# Patient Record
Sex: FEMALE | Race: BLACK OR AFRICAN AMERICAN | Employment: OTHER | URBAN - METROPOLITAN AREA
[De-identification: names, ages, dates, MRNs, and addresses within clinical notes are randomized per-mention and may not be internally consistent; named-entity substitution may affect disease eponyms.]

---

## 2017-06-05 ENCOUNTER — HOSPITAL ENCOUNTER (OUTPATIENT)
Dept: LAB | Age: 82
Discharge: HOME | End: 2017-06-05
Payer: MEDICARE

## 2017-06-05 LAB
ALBUMIN SERPL BCP-MCNC: 3.5 G/DL (ref 3.4–5)
ALBUMIN/GLOB SERPL: 1 {RATIO} (ref 0.8–1.7)
ALP SERPL-CCNC: 79 U/L (ref 45–117)
ALT SERPL-CCNC: 22 U/L (ref 13–56)
ANION GAP BLD CALC-SCNC: 6 MMOL/L (ref 3–18)
AST SERPL W P-5'-P-CCNC: 21 U/L (ref 15–37)
BASOPHILS # BLD AUTO: 0 K/UL (ref 0–0.06)
BASOPHILS # BLD: 0 % (ref 0–2)
BILIRUB SERPL-MCNC: 0.8 MG/DL (ref 0.2–1)
BUN SERPL-MCNC: 15 MG/DL (ref 7–18)
BUN/CREAT SERPL: 15 (ref 12–20)
CALCIUM SERPL-MCNC: 8.9 MG/DL (ref 8.5–10.1)
CHLORIDE SERPL-SCNC: 106 MMOL/L (ref 100–108)
CHOLEST SERPL-MCNC: 131 MG/DL
CO2 SERPL-SCNC: 32 MMOL/L (ref 21–32)
CREAT SERPL-MCNC: 1.02 MG/DL (ref 0.6–1.3)
DIFFERENTIAL METHOD BLD: ABNORMAL
EOSINOPHIL # BLD: 0.2 K/UL (ref 0–0.4)
EOSINOPHIL NFR BLD: 3 % (ref 0–5)
ERYTHROCYTE [DISTWIDTH] IN BLOOD BY AUTOMATED COUNT: 14 % (ref 11.6–14.5)
GLOBULIN SER CALC-MCNC: 3.5 G/DL (ref 2–4)
GLUCOSE SERPL-MCNC: 78 MG/DL (ref 74–99)
HCT VFR BLD AUTO: 37.4 % (ref 35–45)
HDLC SERPL-MCNC: 50 MG/DL (ref 40–60)
HDLC SERPL: 2.6 {RATIO} (ref 0–5)
HGB BLD-MCNC: 11.8 G/DL (ref 12–16)
LDLC SERPL CALC-MCNC: 66.4 MG/DL (ref 0–100)
LIPID PROFILE,FLP: NORMAL
LYMPHOCYTES # BLD AUTO: 27 % (ref 21–52)
LYMPHOCYTES # BLD: 1.6 K/UL (ref 0.9–3.6)
MCH RBC QN AUTO: 29.4 PG (ref 24–34)
MCHC RBC AUTO-ENTMCNC: 31.6 G/DL (ref 31–37)
MCV RBC AUTO: 93.3 FL (ref 74–97)
MONOCYTES # BLD: 0.6 K/UL (ref 0.05–1.2)
MONOCYTES NFR BLD AUTO: 10 % (ref 3–10)
NEUTS SEG # BLD: 3.5 K/UL (ref 1.8–8)
NEUTS SEG NFR BLD AUTO: 60 % (ref 40–73)
PLATELET # BLD AUTO: 174 K/UL (ref 135–420)
PMV BLD AUTO: 11.4 FL (ref 9.2–11.8)
POTASSIUM SERPL-SCNC: 4.3 MMOL/L (ref 3.5–5.5)
PROT SERPL-MCNC: 7 G/DL (ref 6.4–8.2)
RBC # BLD AUTO: 4.01 M/UL (ref 4.2–5.3)
SODIUM SERPL-SCNC: 144 MMOL/L (ref 136–145)
TRIGL SERPL-MCNC: 73 MG/DL (ref ?–150)
VIT B12 SERPL-MCNC: 530 PG/ML (ref 211–911)
VLDLC SERPL CALC-MCNC: 14.6 MG/DL
WBC # BLD AUTO: 6 K/UL (ref 4.6–13.2)

## 2017-06-05 PROCEDURE — 36415 COLL VENOUS BLD VENIPUNCTURE: CPT | Performed by: INTERNAL MEDICINE

## 2017-06-05 PROCEDURE — 80053 COMPREHEN METABOLIC PANEL: CPT | Performed by: INTERNAL MEDICINE

## 2017-06-05 PROCEDURE — 85025 COMPLETE CBC W/AUTO DIFF WBC: CPT | Performed by: INTERNAL MEDICINE

## 2017-06-05 PROCEDURE — 80061 LIPID PANEL: CPT | Performed by: INTERNAL MEDICINE

## 2017-06-05 PROCEDURE — 82607 VITAMIN B-12: CPT | Performed by: INTERNAL MEDICINE

## 2017-06-05 PROCEDURE — 82550 ASSAY OF CK (CPK): CPT | Performed by: INTERNAL MEDICINE

## 2017-06-07 LAB
CK BB CFR SERPL ELPH: 0 %
CK MACRO1 CFR SERPL: 0 %
CK MACRO2 CFR SERPL: 0 %
CK MB CFR SERPL ELPH: 0 % (ref 0–3)
CK MM CFR SERPL ELPH: 100 % (ref 97–100)
CK SERPL-CCNC: 211 U/L (ref 24–173)

## 2017-09-14 ENCOUNTER — HOSPITAL ENCOUNTER (OUTPATIENT)
Dept: MAMMOGRAPHY | Age: 82
Discharge: HOME | End: 2017-09-14
Attending: INTERNAL MEDICINE
Payer: MEDICARE

## 2017-09-14 DIAGNOSIS — Z12.31 VISIT FOR SCREENING MAMMOGRAM: ICD-10-CM

## 2017-09-14 PROCEDURE — 77063 BREAST TOMOSYNTHESIS BI: CPT

## 2017-10-26 ENCOUNTER — OFFICE VISIT (OUTPATIENT)
Dept: CARDIOLOGY CLINIC | Age: 82
End: 2017-10-26

## 2017-10-26 VITALS
HEIGHT: 66 IN | WEIGHT: 215 LBS | SYSTOLIC BLOOD PRESSURE: 127 MMHG | DIASTOLIC BLOOD PRESSURE: 75 MMHG | BODY MASS INDEX: 34.55 KG/M2 | HEART RATE: 87 BPM

## 2017-10-26 DIAGNOSIS — I10 HYPERTENSION, UNSPECIFIED TYPE: Chronic | ICD-10-CM

## 2017-10-26 DIAGNOSIS — E78.2 MIXED HYPERLIPIDEMIA: ICD-10-CM

## 2017-10-26 DIAGNOSIS — N28.9 RENAL INSUFFICIENCY, MILD: Chronic | ICD-10-CM

## 2017-10-26 DIAGNOSIS — R07.9 CHEST PAIN, UNSPECIFIED TYPE: Primary | ICD-10-CM

## 2017-10-26 NOTE — PROGRESS NOTES
HISTORY OF PRESENT ILLNESS  Abraham Camarillo is a 80 y.o. female. New Patient   The history is provided by the patient. This is a chronic problem. The current episode started more than 1 week ago. The problem occurs every several days. The problem has not changed since onset. Associated symptoms include chest pain. Pertinent negatives include no abdominal pain, no headaches and no shortness of breath. Chest Pain (Angina)    The history is provided by the patient. This is a chronic problem. The current episode started more than 1 week ago. The problem has not changed since onset. The problem occurs every several days. The pain is associated with exertion and normal activity. The pain is present in the left side. The pain is at a severity of 3/10. The pain is mild. The quality of the pain is described as sharp. The pain radiates to the left jaw. Associated symptoms include malaise/fatigue. Pertinent negatives include no abdominal pain, no claudication, no cough, no diaphoresis, no dizziness, no fever, no headaches, no hemoptysis, no nausea, no orthopnea, no palpitations, no PND, no shortness of breath, no sputum production, no vomiting and no weakness. Risk factors include hypertension, obesity and dyslipidemia. Her past medical history is significant for HTN. Pertinent negatives include no cardiac catheterization, no echocardiogram and no stress thallium. Review of Systems   Constitutional: Positive for malaise/fatigue. Negative for chills, diaphoresis, fever and weight loss. HENT: Negative for congestion, ear discharge, ear pain, hearing loss, nosebleeds and tinnitus. Eyes: Negative for blurred vision. Respiratory: Negative for cough, hemoptysis, sputum production, shortness of breath, wheezing and stridor. Cardiovascular: Positive for chest pain. Negative for palpitations, orthopnea, claudication, leg swelling and PND. Gastrointestinal: Negative for abdominal pain, heartburn, nausea and vomiting. Musculoskeletal: Negative for myalgias and neck pain. Skin: Negative for itching and rash. Neurological: Negative for dizziness, tingling, tremors, focal weakness, loss of consciousness, weakness and headaches. Psychiatric/Behavioral: Negative for depression and suicidal ideas. No family history on file. Past Medical History:   Diagnosis Date    Arthritis     Hypercholesteremia     Hypertension     Nerve pain        Past Surgical History:   Procedure Laterality Date    HX CATARACT REMOVAL  2007 & 2008    bilateral    HX HYSTERECTOMY  1977    HX KNEE REPLACEMENT Right 2013    Dr. Nguyen Getting    \"slipped disc surgery\"       Social History   Substance Use Topics    Smoking status: Never Smoker    Smokeless tobacco: Never Used    Alcohol use Yes      Comment: glass of wine very seldom       Allergies   Allergen Reactions    Other Medication Itching and Swelling     Apples, pears, strawberries, cherries, any type of berries,avacado,pears,peaches,kiwi. Patient states cherries make throat swell and the others make her throat itch. Outpatient Prescriptions Marked as Taking for the 10/26/17 encounter (Office Visit) with Justino Estevez MD   Medication Sig Dispense Refill    carvedilol (COREG) 6.25 mg tablet Take 6.25 mg by mouth two (2) times daily (with meals).  torsemide (DEMADEX) 20 mg tablet Take 20 mg by mouth daily.  Calcium Carbonate-Vit D3-Min (CALTRATE 600+D PLUS MINERALS) 600 mg calcium- 400 unit Tab Take  by mouth two (2) times a day.  gabapentin (NEURONTIN) 300 mg capsule Take 300 mg by mouth two (2) times a day.  lisinopril (PRINIVIL, ZESTRIL) 20 mg tablet Take 20 mg by mouth daily. Indications: HYPERTENSION      atorvastatin (LIPITOR) 40 mg tablet Take 40 mg by mouth daily.  Indications: HYPERCHOLESTEROLEMIA          Visit Vitals    /75    Pulse 87    Ht 5' 5.5\" (1.664 m)    Wt 97.5 kg (215 lb)    BMI 35.23 kg/m2 Physical Exam   Constitutional: She is oriented to person, place, and time. She appears well-developed and well-nourished. No distress. HENT:   Head: Atraumatic. Mouth/Throat: No oropharyngeal exudate. Eyes: Conjunctivae are normal. Right eye exhibits no discharge. Left eye exhibits no discharge. No scleral icterus. Neck: Normal range of motion. Neck supple. No JVD present. No tracheal deviation present. No thyromegaly present. Cardiovascular: Normal rate and regular rhythm. Exam reveals no gallop. No murmur heard. Pulmonary/Chest: Effort normal and breath sounds normal. No stridor. She has no wheezes. She has no rales. Abdominal: Soft. There is no tenderness. There is no rebound and no guarding. Musculoskeletal: Normal range of motion. She exhibits no edema or tenderness. Lymphadenopathy:     She has no cervical adenopathy. Neurological: She is alert and oriented to person, place, and time. She exhibits normal muscle tone. Skin: Skin is warm. She is not diaphoretic. Psychiatric: She has a normal mood and affect. Her behavior is normal.     ekg sinus rhythm with no acute st-t changes    ASSESSMENT and PLAN    ICD-10-CM ICD-9-CM    1. Chest pain, unspecified type R07.9 786.50 2D ECHO COMPLETE ADULT (TTE)      SCHEDULE PHARMALOGICAL NUCLEAR STRESS TEST    with typical and atypical features   2. Hypertension, unspecified type I10 401.9 AMB POC EKG ROUTINE W/ 12 LEADS, INTER & REP   3. Mixed hyperlipidemia E78.2 272.2    4.  Renal insufficiency, mild N28.9 593.9      Orders Placed This Encounter    SCHEDULE PHARMALOGICAL NUCLEAR STRESS TEST     Standing Status:   Future     Standing Expiration Date:   10/26/2018    2D ECHO COMPLETE ADULT (TTE)     Standing Status:   Future     Standing Expiration Date:   4/26/2018     Order Specific Question:   Reason for Exam:     Answer:   chest pain    AMB POC EKG ROUTINE W/ 12 LEADS, INTER & REP     Order Specific Question:   Reason for Exam: Answer:   chest pain     Follow-up Disposition:  Return in about 3 weeks (around 11/16/2017). current treatment plan is effective, no change in therapy  reviewed diet, exercise and weight control  cardiovascular risk and specific lipid/LDL goals reviewed. Patient seen for chest pain- describes as sharp pain but with radiation to left jaw. Unable to walk on treadmill due to arthritis. Will proceed with lexiscan and echo to assess LV function.   F/u in 3 weeks

## 2017-10-26 NOTE — MR AVS SNAPSHOT
Visit Information Date & Time Provider Department Dept. Phone Encounter #  
 10/26/2017 10:45 AM Margie Montano MD [facility] [address] [facility] [id number] Follow-up Instructions Return in about 3 weeks (around 11/16/2017). Follow-up and Disposition History Your Appointments 11/1/2017  8:00 AM  
PROCEDURE with CA NUC [facility] [place] ([address]) Appt Note: with echo zander wt 215/moe [address], [address] [place] [address]  
[address], [address] [address]  
  
    
 11/1/2017  9:15 AM  
PROCEDURE with CA ECHO [facility] [place] ([address]) Appt Note: with nuc/ moe [address], [address] [place] [address]  
[address], [address] [address]  
  
    
 11/30/2017  2:45 PM  
ESTABLISHED PATIENT with Margie Montano MD  
[facility]) Appt Note: post nuc/echo [address], [address] [place] [address]  
[address], [address] Upcoming Health Maintenance Date Due DTaP/Tdap/Td series (1 - Tdap) 4/18/1956 ZOSTER VACCINE AGE 60> 2/18/1995 GLAUCOMA SCREENING Q2Y 4/18/2000 OSTEOPOROSIS SCREENING (DEXA) 4/18/2000 Pneumococcal 65+ High/Highest Risk (1 of 2 - PCV13) 4/18/2000 MEDICARE YEARLY EXAM 4/18/2000 INFLUENZA AGE 9 TO ADULT 8/1/2017 Allergies as of 10/26/2017  Review Complete On: 10/26/2017 By: Margie Montano MD  
  
 Severity Noted Reaction Type Reactions Other Medication  09/17/2013    Itching, Swelling Apples, pears, strawberries, cherries, any type of berries,avacado,pears,peaches,kiwi. Patient states cherries make throat swell and the others make her throat itch. Current Immunizations  Never Reviewed No immunizations on file. Not reviewed this visit You Were Diagnosed With   
  
 Codes Comments Chest pain, unspecified type    -  Primary ICD-10-CM: R07.9 ICD-9-CM: 786.50 with typical and atypical features Hypertension, unspecified type     ICD-10-CM: I10 
ICD-9-CM: 401.9 Mixed hyperlipidemia     ICD-10-CM: E78.2 ICD-9-CM: 272.2 Renal insufficiency, mild     ICD-10-CM: N28.9 ICD-9-CM: 593.9 Vitals BP Pulse Height(growth percentile) Weight(growth percentile) BMI OB Status 127/75 87 5' 5.5\" (1.664 m) 215 lb (97.5 kg) 35.23 kg/m2 Hysterectomy Smoking Status Never Smoker Vitals History BMI and BSA Data Body Mass Index Body Surface Area  
 35.23 kg/m 2 2.12 m 2 Preferred Pharmacy Pharmacy Name Phone [facility], [address] [facility] [phone] Your Updated Medication List  
  
   
This list is accurate as of: 10/26/17 11:54 AM.  Always use your most recent med list. amLODIPine 10 mg tablet Commonly known as:  Varma Mullet Take 10 mg by mouth daily. Indications: HYPERTENSION  
  
 azelastine 0.05 % ophthalmic solution Commonly known as:  OPTIVAR Administer  to both eyes two (2) times a day. Use in affected eye(s)  
  
 calcium 500 mg Tab Take 1 Tab by mouth daily. CALTRATE 600+D PLUS MINERALS 600 mg calcium- 400 unit Tab Generic drug:  Calcium Carbonate-Vit D3-Min Take  by mouth two (2) times a day. COMBIVENT  mcg/actuation inhaler Generic drug:  ipratropium-albuterol Take 2 Puffs by inhalation every six (6) hours as needed. COREG 6.25 mg tablet Generic drug:  carvedilol Take 6.25 mg by mouth two (2) times daily (with meals). furosemide 20 mg tablet Commonly known as:  LASIX Take 20 mg by mouth daily. gabapentin 300 mg capsule Commonly known as:  NEURONTIN Take 300 mg by mouth two (2) times a day. GAVISCON PO Take  by mouth as needed. LIPITOR 40 mg tablet Generic drug:  atorvastatin Take 40 mg by mouth daily. Indications: HYPERCHOLESTEROLEMIA  
  
 lisinopril 20 mg tablet Commonly known as:  Shira Nichols Take 20 mg by mouth daily. Indications: HYPERTENSION  
  
 OTHER Debax drops for ear wax as needed  
  
 oxyCODONE IR 5 mg immediate release tablet Commonly known as:  King[place]venjun Sant[place]erg Take 1 tablet by mouth every four (4) hours as needed (For pain rating 4-7). potassium chloride 10 mEq tablet Commonly known as:  KLOR-CON Take 10 mEq by mouth daily. [address] [facility] [address] AQUA NA  
by Nasal route as needed. timolol maleate 0.5 % Drpd ophthalmic solution Administer 1 Drop to both eyes two (2) times a day. torsemide 20 mg tablet Commonly known as:  DEMADEX Take 20 mg by mouth daily. We Performed the Following AMB POC EKG ROUTINE W/ 12 LEADS, INTER & REP [95194 CPT(R)] Follow-up Instructions Return in about 3 weeks (around 11/16/2017). To-Do List   
 11/20/2017 Cardiac Services:  2D ECHO COMPLETE ADULT (TTE)   
  
 11/20/2017 Procedures:  SCHEDULE PHARMALOGICAL NUCLEAR STRESS TEST Introducing [facility]! [facility] introduces [employer] patient portal. Now you can access parts of your medical record, email your doctor's office, and request medication refills online. 1. In your internet browser, go to [email or web address] 2. Click on the First Time User? Click Here link in the Sign In box. You will see the New Member Sign Up page. 3. Enter your [employer] Access Code exactly as it appears below. You will not need to use this code after youve completed the sign-up process. If you do not sign up before the expiration date, you must request a new code. · [employer] Access Code: [email or web address] Expires: 11/30/2017  2:24 PM 
 
4.  Enter the last four digits of your Social Security Number (xxxx) and Date of Birth (mm/dd/yyyy) as indicated and click Submit. You will be taken to the next sign-up page. 5. Create a Tr[employer] ID. This will be your [employer] login ID and cannot be changed, so think of one that is secure and easy to remember. 6. Create a Tr[employer] password. You can change your password at any time. 7. Enter your Password Reset Question and Answer. This can be used at a later time if you forget your password. 8. Enter your e-mail address. You will receive e-mail notification when new information is available in [address] 9. Click Sign Up. You can now view and download portions of your medical record. 10. Click the Download Summary menu link to download a portable copy of your medical information. If you have questions, please visit the Frequently Asked Questions section of the [employer] website. Remember, [employer] is NOT to be used for urgent needs. For medical emergencies, dial 911. Now available from your iPhone and Android! Please provide this summary of care documentation to your next provider. Your primary care clinician is listed as [address] [facility]. If you have any questions after today's visit, please call [phone].

## 2017-11-01 ENCOUNTER — CLINICAL SUPPORT (OUTPATIENT)
Dept: CARDIOLOGY CLINIC | Age: 82
End: 2017-11-01

## 2017-11-01 DIAGNOSIS — R07.9 CHEST PAIN, UNSPECIFIED TYPE: ICD-10-CM

## 2017-11-01 DIAGNOSIS — I10 ESSENTIAL HYPERTENSION, MALIGNANT: ICD-10-CM

## 2017-11-01 DIAGNOSIS — R07.9 CHEST PAIN, UNSPECIFIED TYPE: Primary | ICD-10-CM

## 2017-11-01 DIAGNOSIS — E78.2 MIXED HYPERLIPIDEMIA: ICD-10-CM

## 2017-11-01 NOTE — PROGRESS NOTES
[facility]  [address], [address] [place] [address], [place], [address] [place] [address]  [phone] [place]  [phone] [place]       Name: Keisha Abreu         MRN#: [id number]        YOB: 1935   Gender: female Ht:5'5\" Wt:215 lbs       . Date of Rest/Stress Images: 11/1/2017   Referring Physician: Ken Hankins MD  Ordering Physician: Yanelis Kraft MD, [facility]  Technologist: JASSON Ritchie, C.N.M.T  Diagnosis:  1. Chest pain, unspecified type    2. Essential hypertension, malignant    3. Mixed hyperlipidemia          Rest/Stress Myoview SPECT Myocardial Perfusion Imaging with  Lexiscan Stress and gated SPECT Imaging      PROCEDURE:      Myocardial perfusion imaging was performed at rest approximately 30 mins following the intravenous injection,(Right hand ) of 11.6 mCi of Tc99m Myoview for evaluation of myocardial function and perfusion at rest.    Baseline Data:    Baseline EKG reveals sinus rhythm, leftward axis. Baseline heart rate is 70. Baseline blood pressure is 136/78. Procedure: The patient was injected with 0.4 mg IV Lexiscan over a 30 second period. The patient had no significant symptoms. Heart rate increased from baseline to a heart rate of 102. Blood pressure decreased to 124 over 68. Electrocardiogram showed no significant ST-T changes or arrhythmia during the procedure. Diagnosis:   1. Negative EKG portion of Lexiscan stress test.    2. Nuclear imaging report to follow. Pharmacological:  Patient was injected with . 4 mg/mL with Lexiscan intravenously over a period 10 to 20 sec. After pharmacologic stress, the patient was injected intravenously with 35.6 mCi of Tc99m Myoview. Gating post stress tomographic imaging performed approximately 45 minutes post tracer injection.  The data was reconstructed in the short, horizontal long and vertical long axis views and tomographic slices were generated. NUCLEAR IMAGING:    Findings:   1. Post-stress imaging in short axis, horizontal and vertical long axis views reveals normal isotope uptake in all areas. 2. Resting images also show normal isotope uptake in all areas. 3. Gated images show normal left ventricular size, wall motion and systolic function. Ejection fraction is 78%. Diagnosis:   1. Normal scan. 2. No evidence of significant fixed or reversible defect suggesting ischemia or myocardial infarction noted from this nuclear study. 3. Low risk scan. Thank you for the referral.    E-signed and Interpreting Physician:    Monse Ricketts.  Katia Morin MD, [facility]     Date of interpretation: 11/1/2017  Date of final report: 11/1/2017

## 2018-04-28 ENCOUNTER — APPOINTMENT (OUTPATIENT)
Dept: CT IMAGING | Age: 83
DRG: 280 | End: 2018-04-28
Attending: EMERGENCY MEDICINE
Payer: MEDICARE

## 2018-04-28 ENCOUNTER — APPOINTMENT (OUTPATIENT)
Dept: GENERAL RADIOLOGY | Age: 83
DRG: 280 | End: 2018-04-28
Attending: EMERGENCY MEDICINE
Payer: MEDICARE

## 2018-04-28 ENCOUNTER — HOSPITAL ENCOUNTER (INPATIENT)
Age: 83
LOS: 2 days | Discharge: HOME HEALTH | DRG: 280 | End: 2018-04-30
Attending: EMERGENCY MEDICINE | Admitting: INTERNAL MEDICINE
Payer: MEDICARE

## 2018-04-28 DIAGNOSIS — R77.8 ELEVATED TROPONIN: Primary | ICD-10-CM

## 2018-04-28 DIAGNOSIS — R06.00 DYSPNEA, UNSPECIFIED TYPE: ICD-10-CM

## 2018-04-28 LAB
ALBUMIN SERPL-MCNC: 3.6 G/DL (ref 3.4–5)
ALBUMIN/GLOB SERPL: 0.9 {RATIO} (ref 0.8–1.7)
ALP SERPL-CCNC: 85 U/L (ref 45–117)
ALT SERPL-CCNC: 27 U/L (ref 13–56)
ANION GAP SERPL CALC-SCNC: 6 MMOL/L (ref 3–18)
AST SERPL-CCNC: 25 U/L (ref 15–37)
BASOPHILS # BLD: 0 K/UL (ref 0–0.1)
BASOPHILS NFR BLD: 0 % (ref 0–2)
BILIRUB SERPL-MCNC: 1 MG/DL (ref 0.2–1)
BNP SERPL-MCNC: 539 PG/ML (ref 0–1800)
BUN SERPL-MCNC: 12 MG/DL (ref 7–18)
BUN/CREAT SERPL: 12 (ref 12–20)
CALCIUM SERPL-MCNC: 8.8 MG/DL (ref 8.5–10.1)
CHLORIDE SERPL-SCNC: 105 MMOL/L (ref 100–108)
CO2 SERPL-SCNC: 30 MMOL/L (ref 21–32)
CREAT SERPL-MCNC: 1.02 MG/DL (ref 0.6–1.3)
D DIMER PPP FEU-MCNC: 1.04 UG/ML(FEU)
DIFFERENTIAL METHOD BLD: ABNORMAL
EOSINOPHIL # BLD: 0.4 K/UL (ref 0–0.4)
EOSINOPHIL NFR BLD: 4 % (ref 0–5)
ERYTHROCYTE [DISTWIDTH] IN BLOOD BY AUTOMATED COUNT: 14.1 % (ref 11.6–14.5)
GLOBULIN SER CALC-MCNC: 3.8 G/DL (ref 2–4)
GLUCOSE SERPL-MCNC: 116 MG/DL (ref 74–99)
HCT VFR BLD AUTO: 37 % (ref 35–45)
HGB BLD-MCNC: 12 G/DL (ref 12–16)
LIPASE SERPL-CCNC: 73 U/L (ref 73–393)
LYMPHOCYTES # BLD: 1.9 K/UL (ref 0.9–3.6)
LYMPHOCYTES NFR BLD: 17 % (ref 21–52)
MAGNESIUM SERPL-MCNC: 2.1 MG/DL (ref 1.6–2.6)
MCH RBC QN AUTO: 29.7 PG (ref 24–34)
MCHC RBC AUTO-ENTMCNC: 32.4 G/DL (ref 31–37)
MCV RBC AUTO: 91.6 FL (ref 74–97)
MONOCYTES # BLD: 0.8 K/UL (ref 0.05–1.2)
MONOCYTES NFR BLD: 7 % (ref 3–10)
NEUTS SEG # BLD: 8.5 K/UL (ref 1.8–8)
NEUTS SEG NFR BLD: 72 % (ref 40–73)
PLATELET # BLD AUTO: 141 K/UL (ref 135–420)
PMV BLD AUTO: 11.3 FL (ref 9.2–11.8)
POTASSIUM SERPL-SCNC: 3.8 MMOL/L (ref 3.5–5.5)
PROT SERPL-MCNC: 7.4 G/DL (ref 6.4–8.2)
RBC # BLD AUTO: 4.04 M/UL (ref 4.2–5.3)
SODIUM SERPL-SCNC: 141 MMOL/L (ref 136–145)
TROPONIN I SERPL-MCNC: 0.8 NG/ML (ref 0–0.04)
WBC # BLD AUTO: 11.6 K/UL (ref 4.6–13.2)

## 2018-04-28 PROCEDURE — 93005 ELECTROCARDIOGRAM TRACING: CPT

## 2018-04-28 PROCEDURE — 83735 ASSAY OF MAGNESIUM: CPT | Performed by: EMERGENCY MEDICINE

## 2018-04-28 PROCEDURE — 74011250636 HC RX REV CODE- 250/636: Performed by: EMERGENCY MEDICINE

## 2018-04-28 PROCEDURE — 74011250637 HC RX REV CODE- 250/637: Performed by: EMERGENCY MEDICINE

## 2018-04-28 PROCEDURE — 80053 COMPREHEN METABOLIC PANEL: CPT | Performed by: EMERGENCY MEDICINE

## 2018-04-28 PROCEDURE — 85025 COMPLETE CBC W/AUTO DIFF WBC: CPT | Performed by: EMERGENCY MEDICINE

## 2018-04-28 PROCEDURE — 71045 X-RAY EXAM CHEST 1 VIEW: CPT

## 2018-04-28 PROCEDURE — 99285 EMERGENCY DEPT VISIT HI MDM: CPT

## 2018-04-28 PROCEDURE — 85379 FIBRIN DEGRADATION QUANT: CPT | Performed by: EMERGENCY MEDICINE

## 2018-04-28 PROCEDURE — 71275 CT ANGIOGRAPHY CHEST: CPT

## 2018-04-28 PROCEDURE — 83690 ASSAY OF LIPASE: CPT | Performed by: EMERGENCY MEDICINE

## 2018-04-28 PROCEDURE — 84484 ASSAY OF TROPONIN QUANT: CPT | Performed by: EMERGENCY MEDICINE

## 2018-04-28 PROCEDURE — 83880 ASSAY OF NATRIURETIC PEPTIDE: CPT | Performed by: EMERGENCY MEDICINE

## 2018-04-28 PROCEDURE — 74011636320 HC RX REV CODE- 636/320: Performed by: EMERGENCY MEDICINE

## 2018-04-28 PROCEDURE — 65660000000 HC RM CCU STEPDOWN

## 2018-04-28 RX ORDER — FUROSEMIDE 20 MG/1
20 TABLET ORAL DAILY
Status: DISCONTINUED | OUTPATIENT
Start: 2018-04-29 | End: 2018-04-29

## 2018-04-28 RX ORDER — ATORVASTATIN CALCIUM 40 MG/1
40 TABLET, FILM COATED ORAL DAILY
Status: DISCONTINUED | OUTPATIENT
Start: 2018-04-29 | End: 2018-04-30 | Stop reason: HOSPADM

## 2018-04-28 RX ORDER — GUAIFENESIN 100 MG/5ML
324 LIQUID (ML) ORAL
Status: COMPLETED | OUTPATIENT
Start: 2018-04-28 | End: 2018-04-28

## 2018-04-28 RX ORDER — IPRATROPIUM BROMIDE AND ALBUTEROL SULFATE 2.5; .5 MG/3ML; MG/3ML
3 SOLUTION RESPIRATORY (INHALATION)
Status: DISCONTINUED | OUTPATIENT
Start: 2018-04-28 | End: 2018-04-30 | Stop reason: HOSPADM

## 2018-04-28 RX ORDER — POTASSIUM CHLORIDE 750 MG/1
10 TABLET, EXTENDED RELEASE ORAL DAILY
Status: DISCONTINUED | OUTPATIENT
Start: 2018-04-29 | End: 2018-04-30 | Stop reason: HOSPADM

## 2018-04-28 RX ORDER — OXYCODONE HYDROCHLORIDE 5 MG/1
5 TABLET ORAL
Status: DISCONTINUED | OUTPATIENT
Start: 2018-04-28 | End: 2018-04-30 | Stop reason: HOSPADM

## 2018-04-28 RX ORDER — AMLODIPINE BESYLATE 10 MG/1
10 TABLET ORAL DAILY
Status: DISCONTINUED | OUTPATIENT
Start: 2018-04-29 | End: 2018-04-30

## 2018-04-28 RX ORDER — GABAPENTIN 300 MG/1
300 CAPSULE ORAL 2 TIMES DAILY
Status: DISCONTINUED | OUTPATIENT
Start: 2018-04-29 | End: 2018-04-30 | Stop reason: HOSPADM

## 2018-04-28 RX ORDER — SODIUM CHLORIDE 9 MG/ML
25 INJECTION, SOLUTION INTRAVENOUS CONTINUOUS
Status: DISCONTINUED | OUTPATIENT
Start: 2018-04-28 | End: 2018-04-30 | Stop reason: HOSPADM

## 2018-04-28 RX ORDER — SODIUM CHLORIDE 0.9 % (FLUSH) 0.9 %
5-10 SYRINGE (ML) INJECTION AS NEEDED
Status: DISCONTINUED | OUTPATIENT
Start: 2018-04-28 | End: 2018-04-30 | Stop reason: HOSPADM

## 2018-04-28 RX ORDER — SODIUM CHLORIDE 0.9 % (FLUSH) 0.9 %
5-10 SYRINGE (ML) INJECTION EVERY 8 HOURS
Status: DISCONTINUED | OUTPATIENT
Start: 2018-04-28 | End: 2018-04-30 | Stop reason: HOSPADM

## 2018-04-28 RX ORDER — TORSEMIDE 20 MG/1
20 TABLET ORAL DAILY
Status: DISCONTINUED | OUTPATIENT
Start: 2018-04-29 | End: 2018-04-29

## 2018-04-28 RX ORDER — ENOXAPARIN SODIUM 100 MG/ML
40 INJECTION SUBCUTANEOUS EVERY 24 HOURS
Status: DISCONTINUED | OUTPATIENT
Start: 2018-04-28 | End: 2018-04-29

## 2018-04-28 RX ORDER — CARVEDILOL 6.25 MG/1
6.25 TABLET ORAL 2 TIMES DAILY WITH MEALS
Status: DISCONTINUED | OUTPATIENT
Start: 2018-04-29 | End: 2018-04-30 | Stop reason: HOSPADM

## 2018-04-28 RX ORDER — LISINOPRIL 20 MG/1
20 TABLET ORAL DAILY
Status: DISCONTINUED | OUTPATIENT
Start: 2018-04-29 | End: 2018-04-30 | Stop reason: HOSPADM

## 2018-04-28 RX ADMIN — Medication 10 ML: at 22:42

## 2018-04-28 RX ADMIN — ASPIRIN 81 MG 324 MG: 81 TABLET ORAL at 21:22

## 2018-04-28 RX ADMIN — IOPAMIDOL 50 ML: 612 INJECTION, SOLUTION INTRAVENOUS at 20:11

## 2018-04-28 RX ADMIN — SODIUM CHLORIDE 100 ML/HR: 900 INJECTION, SOLUTION INTRAVENOUS at 18:49

## 2018-04-28 NOTE — IP AVS SNAPSHOT
[address] Patient: Thersa Siemens MRN: [id number] PQK:[id number] A check amilcar indicates which time of day the medication should be taken. My Medications START taking these medications Instructions Each Dose to Equal  
 Morning Noon Evening Bedtime  
 aspirin 81 mg chewable tablet Start taking on:  5/1/2018 Your last dose was: Your next dose is: Take 1 Tab by mouth daily. 81 mg CONTINUE taking these medications Instructions Each Dose to Equal  
 Morning Noon Evening Bedtime  
 amLODIPine 10 mg tablet Commonly known as:  Carromie Dupmary Your last dose was: Your next dose is: Take 10 mg by mouth daily. Indications: HYPERTENSION 10 mg  
    
   
   
   
  
 azelastine 0.05 % ophthalmic solution Commonly known as:  OPTIVAR Your last dose was: Your next dose is:    
   
   
 Administer  to both eyes two (2) times a day. Use in affected eye(s) CALTRATE 600+D PLUS MINERALS 600 mg calcium- 400 unit Tab Generic drug:  Calcium Carbonate-Vit D3-Min Your last dose was: Your next dose is: Take  by mouth two (2) times a day. COMBIVENT  mcg/actuation inhaler Generic drug:  ipratropium-albuterol Your last dose was: Your next dose is: Take 2 Puffs by inhalation every six (6) hours as needed. 2 Puff COREG 6.25 mg tablet Generic drug:  carvedilol Your last dose was: Your next dose is: Take 6.25 mg by mouth two (2) times daily (with meals). 6.25 mg  
    
   
   
   
  
 gabapentin 300 mg capsule Commonly known as:  NEURONTIN Your last dose was: Your next dose is: Take 300 mg by mouth two (2) times a day.   
 300 mg  
    
   
 LIPITOR 40 mg tablet Generic drug:  atorvastatin Your last dose was: Your next dose is: Take 40 mg by mouth daily. Indications: HYPERCHOLESTEROLEMIA 40 mg  
    
   
   
   
  
 lisinopril 20 mg tablet Commonly known as:  Jono Human Your last dose was: Your next dose is: Take 20 mg by mouth daily. Indications: HYPERTENSION  
 20 mg  
    
   
   
   
  
 oxyCODONE IR 5 mg immediate release tablet Commonly known as:  Rich Laser Your last dose was: Your next dose is: Take 1 tablet by mouth every four (4) hours as needed (For pain rating 4-7). 5 mg  
    
   
   
   
  
 potassium chloride 10 mEq tablet Commonly known as:  KLOR-CON Your last dose was: Your next dose is: Take 10 mEq by mouth daily. 10 mEq  
    
   
   
   
  
 torsemide 20 mg tablet Commonly known as:  DEMADEX Your last dose was: Your next dose is: Take 20 mg by mouth daily. 20 mg  
    
   
   
   
  
  
STOP taking these medications   
 calcium 500 mg Tab  
   
  
 furosemide 20 mg tablet Commonly known as:  LASIX  
   
  
 GAVISCON PO  
   
  
 OTHER  
   
  
 RHINOCORT AQUA NA  
   
  
 timolol maleate 0.5 % Drpd ophthalmic solution Where to Get Your Medications Information on where to get these meds will be given to you by the nurse or doctor. ! Ask your nurse or doctor about these medications  
  aspirin 81 mg chewable tablet

## 2018-04-28 NOTE — IP AVS SNAPSHOT
[facility] 
 
 
 106 [facility] 17[facility] Patient: Humberto Campbell MRN: [id number] AA About your hospitalization You were admitted on:  2018 You last received care in the:  [address] [place] [address] You were discharged on:  2018 Why you were hospitalized Your primary diagnosis was:  Not on File Your diagnoses also included:  Elevated Troponin, Htn (Hypertension), Mixed Hyperlipidemia, Renal Insufficiency, Mild, Non-Stemi (Non-St Elevated Myocardial Infarction) ([facility]), Acute Diastolic Chf (Congestive Heart Failure) ([facility]) Follow-up Information Follow up With Details Comments Contact Info Danny Livingston MD On 2018 @ 2:00 pm [address] [place] [address] [address] [id number] [phone] Discharge Orders None A check amilcar indicates which time of day the medication should be taken. My Medications START taking these medications Instructions Each Dose to Equal  
 Morning Noon Evening Bedtime  
 aspirin 81 mg chewable tablet Start taking on:  2018 Your last dose was: Your next dose is: Take 1 Tab by mouth daily. 81 mg CONTINUE taking these medications Instructions Each Dose to Equal  
 Morning Noon Evening Bedtime  
 amLODIPine 10 mg tablet Commonly known as:  Kerry Chimmary Your last dose was: Your next dose is: Take 10 mg by mouth daily. Indications: HYPERTENSION 10 mg  
    
   
   
   
  
 azelastine 0.05 % ophthalmic solution Commonly known as:  OPTIVAR Your last dose was: Your next dose is:    
   
   
 Administer  to both eyes two (2) times a day. Use in affected eye(s) CALTRATE 600+D PLUS MINERALS 600 mg calcium- 400 unit Tab Generic drug:  Calcium Carbonate-Vit D3-Min Your last dose was: Your next dose is: Take  by mouth two (2) times a day. COMBIVENT  mcg/actuation inhaler Generic drug:  ipratropium-albuterol Your last dose was: Your next dose is: Take 2 Puffs by inhalation every six (6) hours as needed. 2 Puff COREG 6.25 mg tablet Generic drug:  carvedilol Your last dose was: Your next dose is: Take 6.25 mg by mouth two (2) times daily (with meals). 6.25 mg  
    
   
   
   
  
 gabapentin 300 mg capsule Commonly known as:  NEURONTIN Your last dose was: Your next dose is: Take 300 mg by mouth two (2) times a day. 300 mg  
    
   
   
   
  
 LIPITOR 40 mg tablet Generic drug:  atorvastatin Your last dose was: Your next dose is: Take 40 mg by mouth daily. Indications: HYPERCHOLESTEROLEMIA 40 mg  
    
   
   
   
  
 lisinopril 20 mg tablet Commonly known as:  Velora Royce Your last dose was: Your next dose is: Take 20 mg by mouth daily. Indications: HYPERTENSION  
 20 mg  
    
   
   
   
  
 oxyCODONE IR 5 mg immediate release tablet Commonly known as:  Alexy Allegra Your last dose was: Your next dose is: Take 1 tablet by mouth every four (4) hours as needed (For pain rating 4-7). 5 mg  
    
   
   
   
  
 potassium chloride 10 mEq tablet Commonly known as:  KLOR-CON Your last dose was: Your next dose is: Take 10 mEq by mouth daily. 10 mEq  
    
   
   
   
  
 torsemide 20 mg tablet Commonly known as:  DEMADEX Your last dose was: Your next dose is: Take 20 mg by mouth daily. 20 mg  
    
   
   
   
  
  
STOP taking these medications   
 calcium 500 mg Tab  
   
  
 furosemide 20 mg tablet Commonly known as:  LASIX  
   
  
 GAVISCON PO  
   
  
 OTHER  
   
  
 RHINOCORT AQUA NA  
   
  
 timolol maleate 0.5 % Drpd ophthalmic solution Where to Get Your Medications Information on where to get these meds will be given to you by the nurse or doctor. ! Ask your nurse or doctor about these medications  
  aspirin 81 mg chewable tablet Opioid Education Prescription Opioids: What You Need to Know: 
 
 
 
F-face looks uneven A-arms unable to move or move unevenly S-speech slurred or non-existent T-time-call 911 as soon as signs and symptoms begin-DO NOT go Back to bed or wait to see if you get better-TIME IS BRAIN. Warning Signs of HEART ATTACK Call 911 if you have these symptoms: 
? Chest discomfort. Most heart attacks involve discomfort in the center of the chest that lasts more than a few minutes, or that goes away and comes back. It can feel like uncomfortable pressure, squeezing, fullness, or pain. ? Discomfort in other areas of the upper body. Symptoms can include pain or discomfort in one or both arms, the back, neck, jaw, or stomach. ? Shortness of breath with or without chest discomfort. ? Other signs may include breaking out in a cold sweat, nausea, or lightheadedness. Don't wait more than five minutes to call [address]! Fast action can save your life.  Calling 911 is almost always the fastest way to get lifesaving treatment. Emergency Medical Services staff can begin treatment when they arrive  up to an hour sooner than if someone gets to the hospital by car. MyChart Activation Thank you for requesting access to [employer]. Please follow the instructions below to securely access and download your online medical record. [employer] allows you to send messages to your doctor, view your test results, renew your prescriptions, schedule appointments, and more. How Do I Sign Up? 1. In your internet browser, go to [email or web address] 
2. Click on the First Time User? Click Here link in the Sign In box. You will be redirect to the New Member Sign Up page. 3. Enter your [employer] Access Code exactly as it appears below. You will not need to use this code after youve completed the sign-up process. If you do not sign up before the expiration date, you must request a new code. [employer] Access Code: [email or web address] Expires: 2018  5:27 PM (This is the date your [employer] access code will ) 4. Enter the last four digits of your Social Security Number (xxxx) and Date of Birth (mm/dd/yyyy) as indicated and click Submit. You will be taken to the next sign-up page. 5. Create a [employer] ID. This will be your [employer] login ID and cannot be changed, so think of one that is secure and easy to remember. 6. Create a [employer] password. You can change your password at any time. 7. Enter your Password Reset Question and Answer. This can be used at a later time if you forget your password. 8. Enter your e-mail address. You will receive e-mail notification when new information is available in [address] 9. Click Sign Up. You can now view and download portions of your medical record. 10. Click the Download Summary menu link to download a portable copy of your medical information. Additional Information If you have questions, please visit the Frequently Asked Questions section of the Hers website at [email or web address] Remember, Hers is NOT to be used for urgent needs. For medical emergencies, dial 911. Patient armband removed and shredded The discharge information has been reviewed with the patient. The patient verbalized understanding. Discharge medications reviewed with the patient and appropriate educational materials and side effects teaching were provided. ___________________________________________________________________________________________________________________________________ Introducing \[facility] [place] [facility]\"" & HEALTH SERVICES! [employer] introduces Hers patient portal. Now you can access parts of your medical record, email your doctor's office, and request medication refills online. 1. In your internet browser, go to [email or web address] 2. Click on the First Time User? Click Here link in the Sign In box. You will see the New Member Sign Up page. 3. Enter your Hers Access Code exactly as it appears below. You will not need to use this code after youve completed the sign-up process. If you do not sign up before the expiration date, you must request a new code. · Hers Access Code: [email or web address] Expires: 7/27/2018  5:27 PM 
 
4. Enter the last four digits of your Social Security Number (xxxx) and Date of Birth (mm/dd/yyyy) as indicated and click Submit. You will be taken to the next sign-up page. 5. Create a Hers ID. This will be your Hers login ID and cannot be changed, so think of one that is secure and easy to remember. 6. Create a Hers password. You can change your password at any time. 7. Enter your Password Reset Question and Answer. This can be used at a later time if you forget your password. 8. Enter your e-mail address. You will receive e-mail notification when new information is available in [employer]. 9. Click Sign Up.  You can now view and download portions of your medical record. 10. Click the Download Summary menu link to download a portable copy of your medical information. If you have questions, please visit the Frequently Asked Questions section of the [employer] website. Remember, [employer] is NOT to be used for urgent needs. For medical emergencies, dial 911. Now available from your iPhone and Android! Introducing Harpal Andrea As [facility], I wanted to make you aware of our electronic visit tool called Harpal Andrea. [employer] allows you to connect within minutes with a medical provider 24 hours a day, seven days a week via a mobile device or tablet or logging into a secure website from your computer. You can access Harpal Andrea from anywhere in the [place]. A virtual visit might be right for you when you have a simple condition and feel like you just dont want to get out of bed, or cant get away from work for an appointment, when your regular [facility] provider is not available (evenings, weekends or holidays), or when youre out of town and need minor care. Electronic visits cost only $49 and if the [employer] provider determines a prescription is needed to treat your condition, one can be electronically transmitted to a nearby pharmacy*. Please take a moment to enroll today if you have not already done so. The enrollment process is free and takes just a few minutes. To enroll, please download the Zevia karen to your tablet or phone, or visit [email or web address] to enroll on your computer. And, as an [address] [place] [address] patient with a [employer] account, the results of your visits will be scanned into your electronic medical record and your primary care provider will be able to view the scanned results. We urge you to continue to see your regular [facility] provider for your ongoing medical care.   And while your primary care provider may not be the one available when you seek a Harpal Chuachristopherfin virtual visit, the peace of mind you get from getting a real diagnosis real time can be priceless. For more information on Harpal Templefin, view our Frequently Asked Questions (FAQs) at [email or web address]. Sincerely, 
 
Tutu Gipson MD 
Chief Medical Officer Sherrie Talley *:  certain medications cannot be prescribed via Harpal Harshilchristopherfin Providers Seen During Your Hospitalization Provider Specialty Primary office phone Marquise Hedrick MD Emergency Medicine [phone] Kaylee Dominguez DO Emergency Medicine [phone] Luis Alfredo Barrientos MD Hospitalist [phone] Monika Walden MD Family Practice [phone] Your Primary Care Physician (PCP) Primary Care Physician Office Phone Office Fax Layne Philips [phone] You are allergic to the following Allergen Reactions Other Medication Itching Swelling Apples, pears, strawberries, cherries, any type of berries,avacado,pears,peaches,kiwi. Patient states cherries make throat swell and the others make her throat itch. Recent Documentation Height Weight Breastfeeding? BMI OB Status Smoking Status 1.66 m 90.9 kg No 32.99 kg/m2 Hysterectomy Never Smoker Emergency Contacts Name Discharge Info Relation Home Work Mobile Yeni Weiss DECLINED CAREGIVER [4] Child [2] [phone] Patient Belongings The following personal items are in your possession at time of discharge: 
  Dental Appliances: None  Visual Aid: None      Home Medications: None      Clothing: At bedside, Footwear, Pants, Shirt, Undergarments    Other Valuables: None  Personal Items Sent to Safe: none Please provide this summary of care documentation to your next provider.  
  
  
 
  
Signatures-by signing, you are acknowledging that this After Visit Summary has been reviewed with you and you have received a copy. Patient Signature:  ____________________________________________________________ Date:  ____________________________________________________________  
  
Zenaida Fines Provider Signature:  ____________________________________________________________ Date:  ____________________________________________________________

## 2018-04-28 NOTE — ED NOTES
Bedside shift change report given to Jeannette Grider RN (oncoming nurse) by Ortiz Corbett RN (offgoing nurse). Report included the following information SBAR, ED Summary, MAR and Recent Results.

## 2018-04-28 NOTE — ED NOTES
Pt brought in by ambulance complaining of SOB, EMS could hear pt attempting to breathe across the room. Pt O2 sats were 73% on room air, pt given a duo-neb treatment, and pt sats went up to 100%, upon [facility] arrival pt was at 97%. Pt a&ox4, in NAD.

## 2018-04-28 NOTE — ED NOTES
7:00 PM :Pt care assumed from Dr. Aby Giang , ED provider. Pt complaint(s), current treatment plan, progression and available diagnostic results have been discussed thoroughly. Rounding occurred: yes  Intended Disposition: TBD   Pending diagnostic reports and/or labs (please list): Awaiting lab results and CXR read     XR CHEST PORT  IMPRESSION:  No radiographic finding for an acute cardiopulmonary process. Stable appearance of the chest.    7:36 PM: Pt has elevated troponin and d-dimer. Will obtain CTA chest to evaluate for PE. Will need admission. 9:30 PM  Discussed results with pt, trop elevated. CTA neg. Given asa. Pt denies any chest pain at this time. Will plan for admission for acs r/o.     9:56 PM Consult: I discussed care with Dr. Miguel Oquendo (Hospitalist). It was a standard discussion including patient history, chief complaint, available diagnostic results, and predicted treatment course. Agrees to admit. Scribe [address] [place] [address] acting as a scribe for and in the presence of Don Heredia DO      April 28, 2018 at 7:36 PM       Provider Attestation:      I personally performed the services described in the documentation, reviewed the documentation, as recorded by the scribe in my presence, and it accurately and completely records my words and actions.  April 28, 2018 at 7:36 PM - Don Heredia DO

## 2018-04-28 NOTE — ED PROVIDER NOTES
EMERGENCY DEPARTMENT HISTORY AND PHYSICAL EXAM    5:32 PM      Date: 4/28/2018  Patient Name: Shahida Lawson    History of Presenting Illness     Chief Complaint   Patient presents with    Shortness of Breath         History Provided By: Patient    Chief Complaint: SOB  Duration: 7 hours  Timing:  Acute  Location: chest  Quality: Tightness  Severity: Moderate  Modifying Factors: improved with breathing treatment  Associated Symptoms: chest pain, cough      Additional History (Context): Shahida Lawson is a 80 y.o. female with hypertension, hypercholerestemia who presents via EMS with SOB that began suddenly today at 10 AM, per patient. Patient reports lower central chest pain with deep breaths. Pt reports improvement after breathing treatment given en route She notes mild cough throughout last night without fever. She denies recent travel or blood clot history. No other symptoms or concerns were expressed.       PCP: Nahed Talley MD    Current Facility-Administered Medications   Medication Dose Route Frequency Provider Last Rate Last Dose    sodium chloride (NS) flush 5-10 mL  5-10 mL IntraVENous Q8H Jasmin Chase MD        sodium chloride (NS) flush 5-10 mL  5-10 mL IntraVENous PRN Jasmin Chase MD       [address] [place] [address] [START ON 5/1/2018] amLODIPine (NORVASC) tablet 5 mg  5 mg Oral DAILY Jasmin Chase MD        aspirin chewable tablet 81 mg  81 mg Oral DAILY Elinor Baires NP   Stopped at 04/30/18 0900    0.9% sodium chloride infusion  25 mL/hr IntraVENous CONTINUOUS Vero Red MD 25 mL/hr at 04/30/18 0301 25 mL/hr at 04/30/18 0301    sodium chloride (NS) flush 5-10 mL  5-10 mL IntraVENous Q8H Michel Gonzalez MD   5 mL at 04/30/18 0600    sodium chloride (NS) flush 5-10 mL  5-10 mL IntraVENous PRN Michel Gonzalez MD        oxyCODONE IR (ROXICODONE) tablet 5 mg  5 mg Oral Q4H PRN Michel Gonzalez MD        atorvastatin (LIPITOR) tablet 40 mg  40 mg Oral DAILY Michel Gonzalez MD   40 mg at 04/30/18 1120    carvedilol (COREG) tablet 6.25 mg  6.25 mg Oral BID WITH MEALS Dominguez Biggs MD   6.25 mg at 04/30/18 1120    gabapentin (NEURONTIN) capsule 300 mg  300 mg Oral BID Dominguez Biggs MD   300 mg at 04/30/18 1121    lisinopril (PRINIVIL, ZESTRIL) tablet 20 mg  20 mg Oral DAILY Dominguez Biggs MD   20 mg at 04/30/18 1121    albuterol-ipratropium (DUO-NEB) 2.5 MG-0.5 MG/3 ML  3 mL Nebulization Q6H PRN Dominguez Biggs MD        potassium chloride (KLOR-CON) tablet 10 mEq  10 mEq Oral DAILY Dominguez Biggs MD   10 mEq at 04/30/18 1121     Current Outpatient Prescriptions   Medication Sig Dispense Refill    [START ON 5/1/2018] aspirin 81 mg chewable tablet Take 1 Tab by mouth daily. 30 Tab 0    oxyCODONE IR (ROXICODONE) 5 mg immediate release tablet Take 1 tablet by mouth every four (4) hours as needed (For pain rating 4-7). 60 tablet 0    carvedilol (COREG) 6.25 mg tablet Take 6.25 mg by mouth two (2) times daily (with meals).  torsemide (DEMADEX) 20 mg tablet Take 20 mg by mouth daily.  azelastine (OPTIVAR) 0.05 % ophthalmic solution Administer  to both eyes two (2) times a day. Use in affected eye(s)      Calcium Carbonate-Vit D3-Min (CALTRATE 600+D PLUS MINERALS) 600 mg calcium- 400 unit Tab Take  by mouth two (2) times a day.  ipratropium-albuterol (COMBIVENT)  mcg/actuation inhaler Take 2 Puffs by inhalation every six (6) hours as needed.  gabapentin (NEURONTIN) 300 mg capsule Take 300 mg by mouth two (2) times a day.  amLODIPine (NORVASC) 10 mg tablet Take 10 mg by mouth daily. Indications: HYPERTENSION      lisinopril (PRINIVIL, ZESTRIL) 20 mg tablet Take 20 mg by mouth daily. Indications: HYPERTENSION      atorvastatin (LIPITOR) 40 mg tablet Take 40 mg by mouth daily. Indications: HYPERCHOLESTEROLEMIA      potassium chloride (K-DUR, KLOR-CON) 10 mEq tablet Take 10 mEq by mouth daily.          Past History     Past Medical History:  Past Medical History:   Diagnosis Date    Arthritis     Hypercholesteremia     Hypertension     Nerve pain        Past Surgical History:  Past Surgical History:   Procedure Laterality Date    HX CATARACT REMOVAL  2007 & 2008    bilateral    HX HYSTERECTOMY  1977    HX KNEE REPLACEMENT Right 2013    Dr. George Pearson    \"slipped disc surgery\"       Family History:  History reviewed. No pertinent family history. Social History:  Social History   Substance Use Topics    Smoking status: Never Smoker    Smokeless tobacco: Never Used    Alcohol use Yes      Comment: glass of wine very seldom       Allergies: Allergies   Allergen Reactions    Other Medication Itching and Swelling     Apples, pears, strawberries, cherries, any type of berries,[place],pears,peaches,kiwi. Patient states cherries make throat swell and the others make her throat itch. Review of Systems       Review of Systems   Constitutional: Negative for activity change, appetite change, chills, diaphoresis, fatigue, fever and unexpected weight change. HENT: Negative for congestion, dental problem, drooling, ear discharge, ear pain, facial swelling, hearing loss, mouth sores, nosebleeds, postnasal drip, rhinorrhea, sinus pressure, sneezing, sore throat, tinnitus and trouble swallowing. Eyes: Negative for photophobia, pain, discharge, redness, itching and visual disturbance. Respiratory: Positive for cough and shortness of breath. Negative for apnea, choking, chest tightness, wheezing and stridor. Cardiovascular: Positive for chest pain. Negative for palpitations and leg swelling. Gastrointestinal: Negative for abdominal distention, abdominal pain, anal bleeding, blood in stool, constipation, diarrhea, nausea, rectal pain and vomiting. Endocrine: Negative for cold intolerance, heat intolerance, polydipsia, polyphagia and polyuria.    Genitourinary: Negative for decreased urine volume, difficulty urinating, dysuria, enuresis, flank pain, frequency, genital sores, hematuria and urgency. Musculoskeletal: Negative for arthralgias, back pain, gait problem, joint swelling, myalgias, neck pain and neck stiffness. Skin: Negative for color change, pallor, rash and wound. Allergic/Immunologic: Negative for environmental allergies, food allergies and immunocompromised state. Neurological: Negative for dizziness, tremors, seizures, syncope, facial asymmetry, speech difficulty, weakness, light-headedness, numbness and headaches. Hematological: Negative for adenopathy. Does not bruise/bleed easily. Psychiatric/Behavioral: Negative for agitation, behavioral problems, confusion, decreased concentration, dysphoric mood, hallucinations, self-injury, sleep disturbance and suicidal ideas. The patient is not nervous/anxious and is not hyperactive. Physical Exam     Visit Vitals    /70 (BP 1 Location: Left arm, BP Patient Position: At rest)    Pulse 72    Temp 98.8 °F (37.1 °C)    Resp 18    Ht 5' 5.35\" (1.66 m)    Wt 90.9 kg (200 lb 6.4 oz)    SpO2 97%    Breastfeeding No    BMI 32.99 kg/m2         Physical Exam   Constitutional: She is oriented to person, place, and time. She appears well-developed and well-nourished. Alert and appropriate with apparent mild tachypnea and dyspnea without signs of respiratory distress   HENT:   Head: Normocephalic and atraumatic. Right Ear: External ear normal.   Left Ear: External ear normal.   Mouth/Throat: Oropharynx is clear and moist. No oropharyngeal exudate. Eyes: Conjunctivae and EOM are normal. Pupils are equal, round, and reactive to light. Right eye exhibits no discharge. Left eye exhibits no discharge. No scleral icterus. Neck: Normal range of motion. No tracheal deviation present. No thyromegaly present. Cardiovascular: Normal rate, regular rhythm and normal heart sounds. No murmur heard.   Pulmonary/Chest: Effort normal and breath sounds normal. No respiratory distress. She has no wheezes. She has no rales. She exhibits no tenderness. Abdominal: Soft. Bowel sounds are normal. She exhibits no distension. There is no tenderness. There is no rebound and no guarding. Musculoskeletal: Normal range of motion. She exhibits no edema or tenderness. Lymphadenopathy:     She has no cervical adenopathy. Neurological: She is alert and oriented to person, place, and time. No cranial nerve deficit. Coordination normal.   Skin: Skin is warm. No erythema. Psychiatric: She has a normal mood and affect. Her behavior is normal. Judgment and thought content normal.   Nursing note and vitals reviewed. Diagnostic Study Results     Labs -  Recent Results (from the past 12 hour(s))   CBC WITH AUTOMATED DIFF    Collection Time: 04/28/18  5:54 PM   Result Value Ref Range    WBC 11.6 4.6 - 13.2 K/uL    RBC 4.04 (L) 4.20 - 5.30 M/uL    HGB 12.0 12.0 - 16.0 g/dL    HCT 37.0 35.0 - 45.0 %    MCV 91.6 74.0 - 97.0 FL    MCH 29.7 24.0 - 34.0 PG    MCHC 32.4 31.0 - 37.0 g/dL    RDW 14.1 11.6 - 14.5 %    PLATELET 781 212 - 318 K/uL    MPV 11.3 9.2 - 11.8 FL    NEUTROPHILS 72 40 - 73 %    LYMPHOCYTES 17 (L) 21 - 52 %    MONOCYTES 7 3 - 10 %    EOSINOPHILS 4 0 - 5 %    BASOPHILS 0 0 - 2 %    ABS. NEUTROPHILS 8.5 (H) 1.8 - 8.0 K/UL    ABS. LYMPHOCYTES 1.9 0.9 - 3.6 K/UL    ABS. MONOCYTES 0.8 0.05 - 1.2 K/UL    ABS. EOSINOPHILS 0.4 0.0 - 0.4 K/UL    ABS. BASOPHILS 0.0 0.0 - 0.1 K/UL    DF AUTOMATED       Radiologic Studies -   CXR- pending at time of turnover      Medical Decision Making   I am the first provider for this patient. I reviewed the vital signs, available nursing notes, past medical history, past surgical history, family history and social history. Vital Signs-Reviewed the patient's vital signs. EKG: Interpreted by the EP.    Time Interpreted: 1736   Rate: 104   Rhythm: Sinus Tachycardia, left axis deviation without acute S-T or T-wave changes       Records Reviewed: Nursing Notes and Old Medical Records (Time of Review: 5:32 PM)    ED Course: Progress Notes, Reevaluation, and Consults:  Patient without signs of respiratory failure and work-up is pending at time of turnover at 1900. Provider Notes (Medical Decision Making): Patient with acute chest discomfort with reassuring examination without signs of vascular or hypertensive emergencies. Pain may be consistent with bronchitis but will still evaluate for possible arrhythmia, ACS, pneumonia, pericarditis, electrolyte abnormality, anemia, PE, or pneumothorax. Diagnosis     Clinical Impression:   1. Elevated troponin    2. Dyspnea, unspecified type        Disposition: Pending at time of turnover    Follow-up Information     Follow up With Details Comments [id number] Berry Rock MD On 5/9/2018 @ 2:00 pm Malena Jade [phone]             Discharge Medication List as of 4/30/2018  5:33 PM      START taking these medications    Details   aspirin 81 mg chewable tablet Take 1 Tab by mouth daily. , Print, Disp-30 Tab, R-0         CONTINUE these medications which have NOT CHANGED    Details   oxyCODONE IR (ROXICODONE) 5 mg immediate release tablet Take 1 tablet by mouth every four (4) hours as needed (For pain rating 4-7). , Print, Disp-60 tablet, R-0      carvedilol (COREG) 6.25 mg tablet Take 6.25 mg by mouth two (2) times daily (with meals). , Historical Med      torsemide (DEMADEX) 20 mg tablet Take 20 mg by mouth daily. , Historical Med      azelastine (OPTIVAR) 0.05 % ophthalmic solution Administer  to both eyes two (2) times a day.  Use in affected eye(s), Historical Med      Calcium Carbonate-Vit D3-Min (CALTRATE 600+D PLUS MINERALS) 600 mg calcium- 400 unit Tab Take  by mouth two (2) times a day., Historical Med      ipratropium-albuterol (COMBIVENT)  mcg/actuation inhaler Take 2 Puffs by inhalation every six (6) hours as needed., Historical Med gabapentin (NEURONTIN) 300 mg capsule Take 300 mg by mouth two (2) times a day., Historical Med      amLODIPine (NORVASC) 10 mg tablet Take 10 mg by mouth daily. Indications: HYPERTENSION, Historical Med      lisinopril (PRINIVIL, ZESTRIL) 20 mg tablet Take 20 mg by mouth daily. Indications: HYPERTENSION, Historical Med      atorvastatin (LIPITOR) 40 mg tablet Take 40 mg by mouth daily. Indications: HYPERCHOLESTEROLEMIA, Historical Med      potassium chloride (K-DUR, KLOR-CON) 10 mEq tablet Take 10 mEq by mouth daily. , Historical Med         STOP taking these medications       timolol maleate 0.5 % DrpD ophthalmic solution Comments:   Reason for Stopping:         MG TRISILICATE/ALH/NAHCO3/AA (GAVISCON PO) Comments:   Reason for Stopping:         OTHER Comments:   Reason for Stopping:         BUDESONIDE (RHINOCORT AQUA NA) Comments:   Reason for Stopping:         furosemide (LASIX) 20 mg tablet Comments:   Reason for Stopping:         calcium 500 mg Tab Comments:   Reason for Stopping:             _______________________________    Attestations:  Scribe Attestation     Norma Brown acting as a scribe for and in the presence of Cathryn Davies MD      April 28, 2018 at 5:32 PM       Provider Attestation:      I personally performed the services described in the documentation, reviewed the documentation, as recorded by the scribe in my presence, and it accurately and completely records my words and actions.  April 28, 2018 at 5:32 PM - Cathryn Davies MD    _______________________________

## 2018-04-29 PROBLEM — I21.4 NON-STEMI (NON-ST ELEVATED MYOCARDIAL INFARCTION) (HCC): Status: ACTIVE | Noted: 2018-04-29

## 2018-04-29 PROBLEM — I50.31 ACUTE DIASTOLIC CHF (CONGESTIVE HEART FAILURE) (HCC): Status: ACTIVE | Noted: 2018-04-29

## 2018-04-29 LAB
ANION GAP SERPL CALC-SCNC: 7 MMOL/L (ref 3–18)
APTT PPP: 46.9 SEC (ref 23–36.4)
APTT PPP: >180 SEC (ref 23–36.4)
APTT PPP: >180 SEC (ref 23–36.4)
ATRIAL RATE: 104 BPM
ATRIAL RATE: 95 BPM
BASOPHILS # BLD: 0 K/UL (ref 0–0.1)
BASOPHILS # BLD: 0 K/UL (ref 0–0.1)
BASOPHILS NFR BLD: 0 % (ref 0–2)
BASOPHILS NFR BLD: 0 % (ref 0–2)
BUN SERPL-MCNC: 11 MG/DL (ref 7–18)
BUN/CREAT SERPL: 12 (ref 12–20)
CALCIUM SERPL-MCNC: 8.2 MG/DL (ref 8.5–10.1)
CALCULATED P AXIS, ECG09: 2 DEGREES
CALCULATED P AXIS, ECG09: 22 DEGREES
CALCULATED R AXIS, ECG10: -37 DEGREES
CALCULATED R AXIS, ECG10: -38 DEGREES
CALCULATED T AXIS, ECG11: 24 DEGREES
CALCULATED T AXIS, ECG11: 34 DEGREES
CHLORIDE SERPL-SCNC: 108 MMOL/L (ref 100–108)
CK MB CFR SERPL CALC: 3.9 % (ref 0–4)
CK MB SERPL-MCNC: 12.2 NG/ML (ref 5–25)
CK SERPL-CCNC: 313 U/L (ref 26–192)
CO2 SERPL-SCNC: 27 MMOL/L (ref 21–32)
CREAT SERPL-MCNC: 0.89 MG/DL (ref 0.6–1.3)
DIAGNOSIS, 93000: NORMAL
DIAGNOSIS, 93000: NORMAL
DIFFERENTIAL METHOD BLD: ABNORMAL
DIFFERENTIAL METHOD BLD: ABNORMAL
EOSINOPHIL # BLD: 0.3 K/UL (ref 0–0.4)
EOSINOPHIL # BLD: 0.4 K/UL (ref 0–0.4)
EOSINOPHIL NFR BLD: 4 % (ref 0–5)
EOSINOPHIL NFR BLD: 4 % (ref 0–5)
ERYTHROCYTE [DISTWIDTH] IN BLOOD BY AUTOMATED COUNT: 14.3 % (ref 11.6–14.5)
ERYTHROCYTE [DISTWIDTH] IN BLOOD BY AUTOMATED COUNT: 14.3 % (ref 11.6–14.5)
GLUCOSE SERPL-MCNC: 97 MG/DL (ref 74–99)
HCT VFR BLD AUTO: 34.7 % (ref 35–45)
HCT VFR BLD AUTO: 35.8 % (ref 35–45)
HGB BLD-MCNC: 11.2 G/DL (ref 12–16)
HGB BLD-MCNC: 11.3 G/DL (ref 12–16)
LYMPHOCYTES # BLD: 1.6 K/UL (ref 0.9–3.6)
LYMPHOCYTES # BLD: 1.9 K/UL (ref 0.9–3.6)
LYMPHOCYTES NFR BLD: 19 % (ref 21–52)
LYMPHOCYTES NFR BLD: 22 % (ref 21–52)
MCH RBC QN AUTO: 29.4 PG (ref 24–34)
MCH RBC QN AUTO: 29.6 PG (ref 24–34)
MCHC RBC AUTO-ENTMCNC: 31.6 G/DL (ref 31–37)
MCHC RBC AUTO-ENTMCNC: 32.3 G/DL (ref 31–37)
MCV RBC AUTO: 91.6 FL (ref 74–97)
MCV RBC AUTO: 93 FL (ref 74–97)
MONOCYTES # BLD: 0.7 K/UL (ref 0.05–1.2)
MONOCYTES # BLD: 0.9 K/UL (ref 0.05–1.2)
MONOCYTES NFR BLD: 11 % (ref 3–10)
MONOCYTES NFR BLD: 8 % (ref 3–10)
NEUTS SEG # BLD: 5.5 K/UL (ref 1.8–8)
NEUTS SEG # BLD: 5.6 K/UL (ref 1.8–8)
NEUTS SEG NFR BLD: 66 % (ref 40–73)
NEUTS SEG NFR BLD: 66 % (ref 40–73)
P-R INTERVAL, ECG05: 136 MS
P-R INTERVAL, ECG05: 140 MS
PLATELET # BLD AUTO: 132 K/UL (ref 135–420)
PLATELET # BLD AUTO: 135 K/UL (ref 135–420)
PMV BLD AUTO: 10.7 FL (ref 9.2–11.8)
PMV BLD AUTO: 11.4 FL (ref 9.2–11.8)
POTASSIUM SERPL-SCNC: 3.8 MMOL/L (ref 3.5–5.5)
Q-T INTERVAL, ECG07: 350 MS
Q-T INTERVAL, ECG07: 382 MS
QRS DURATION, ECG06: 78 MS
QRS DURATION, ECG06: 80 MS
QTC CALCULATION (BEZET), ECG08: 460 MS
QTC CALCULATION (BEZET), ECG08: 480 MS
RBC # BLD AUTO: 3.79 M/UL (ref 4.2–5.3)
RBC # BLD AUTO: 3.85 M/UL (ref 4.2–5.3)
SODIUM SERPL-SCNC: 142 MMOL/L (ref 136–145)
TROPONIN I SERPL-MCNC: 4.3 NG/ML (ref 0–0.04)
VENTRICULAR RATE, ECG03: 104 BPM
VENTRICULAR RATE, ECG03: 95 BPM
WBC # BLD AUTO: 8.4 K/UL (ref 4.6–13.2)
WBC # BLD AUTO: 8.6 K/UL (ref 4.6–13.2)

## 2018-04-29 PROCEDURE — 36415 COLL VENOUS BLD VENIPUNCTURE: CPT | Performed by: INTERNAL MEDICINE

## 2018-04-29 PROCEDURE — 74011250637 HC RX REV CODE- 250/637: Performed by: NURSE PRACTITIONER

## 2018-04-29 PROCEDURE — 74011250636 HC RX REV CODE- 250/636: Performed by: INTERNAL MEDICINE

## 2018-04-29 PROCEDURE — 82550 ASSAY OF CK (CPK): CPT | Performed by: INTERNAL MEDICINE

## 2018-04-29 PROCEDURE — 85730 THROMBOPLASTIN TIME PARTIAL: CPT | Performed by: INTERNAL MEDICINE

## 2018-04-29 PROCEDURE — 74011250637 HC RX REV CODE- 250/637: Performed by: INTERNAL MEDICINE

## 2018-04-29 PROCEDURE — 85025 COMPLETE CBC W/AUTO DIFF WBC: CPT | Performed by: INTERNAL MEDICINE

## 2018-04-29 PROCEDURE — 77010033678 HC OXYGEN DAILY

## 2018-04-29 PROCEDURE — 80048 BASIC METABOLIC PNL TOTAL CA: CPT | Performed by: INTERNAL MEDICINE

## 2018-04-29 PROCEDURE — 74011250636 HC RX REV CODE- 250/636: Performed by: EMERGENCY MEDICINE

## 2018-04-29 PROCEDURE — 65660000000 HC RM CCU STEPDOWN

## 2018-04-29 RX ORDER — HEPARIN SODIUM 10000 [USP'U]/100ML
10.834-25 INJECTION, SOLUTION INTRAVENOUS
Status: DISCONTINUED | OUTPATIENT
Start: 2018-04-29 | End: 2018-04-30

## 2018-04-29 RX ORDER — HEPARIN SODIUM 1000 [USP'U]/ML
INJECTION, SOLUTION INTRAVENOUS; SUBCUTANEOUS
Status: DISPENSED
Start: 2018-04-29 | End: 2018-04-30

## 2018-04-29 RX ORDER — FUROSEMIDE 10 MG/ML
40 INJECTION INTRAMUSCULAR; INTRAVENOUS ONCE
Status: COMPLETED | OUTPATIENT
Start: 2018-04-29 | End: 2018-04-29

## 2018-04-29 RX ORDER — GUAIFENESIN 100 MG/5ML
81 LIQUID (ML) ORAL DAILY
Status: DISCONTINUED | OUTPATIENT
Start: 2018-04-29 | End: 2018-04-30 | Stop reason: HOSPADM

## 2018-04-29 RX ADMIN — AMLODIPINE BESYLATE 10 MG: 10 TABLET ORAL at 08:44

## 2018-04-29 RX ADMIN — CARVEDILOL 6.25 MG: 6.25 TABLET, FILM COATED ORAL at 17:14

## 2018-04-29 RX ADMIN — HEPARIN SODIUM 999.97 UNITS/HR: 10000 INJECTION, SOLUTION INTRAVENOUS at 06:12

## 2018-04-29 RX ADMIN — POTASSIUM CHLORIDE 10 MEQ: 750 TABLET, EXTENDED RELEASE ORAL at 08:44

## 2018-04-29 RX ADMIN — ASPIRIN 81 MG 81 MG: 81 TABLET ORAL at 11:53

## 2018-04-29 RX ADMIN — SODIUM CHLORIDE 100 ML/HR: 900 INJECTION, SOLUTION INTRAVENOUS at 05:08

## 2018-04-29 RX ADMIN — FUROSEMIDE 40 MG: 10 INJECTION, SOLUTION INTRAVENOUS at 11:53

## 2018-04-29 RX ADMIN — GABAPENTIN 300 MG: 300 CAPSULE ORAL at 08:44

## 2018-04-29 RX ADMIN — Medication 10 ML: at 07:05

## 2018-04-29 RX ADMIN — GABAPENTIN 300 MG: 300 CAPSULE ORAL at 17:14

## 2018-04-29 RX ADMIN — FUROSEMIDE 20 MG: 20 TABLET ORAL at 08:44

## 2018-04-29 RX ADMIN — LISINOPRIL 20 MG: 20 TABLET ORAL at 08:44

## 2018-04-29 RX ADMIN — CARVEDILOL 6.25 MG: 6.25 TABLET, FILM COATED ORAL at 08:44

## 2018-04-29 RX ADMIN — Medication 5 ML: at 22:00

## 2018-04-29 RX ADMIN — TORSEMIDE 20 MG: 20 TABLET ORAL at 08:44

## 2018-04-29 RX ADMIN — ATORVASTATIN CALCIUM 40 MG: 40 TABLET, FILM COATED ORAL at 08:44

## 2018-04-29 RX ADMIN — ENOXAPARIN SODIUM 40 MG: 40 INJECTION SUBCUTANEOUS at 05:07

## 2018-04-29 NOTE — PROGRESS NOTES
Cardiology:      I was consulted to see this patient this morning, however in reviewing the chart it would appear that this is a patient of Dr. Simeon Owens and I have contacted him and he will be completing consultation later today.     Jolly Bishop, DO

## 2018-04-29 NOTE — CONSULTS
[facility] - Consult Note    Date of  Admission: 4/28/2018  5:23 PM     Primary Care Physician:  Ho Capps MD     Plan:     1. NSTEMI - Trop I 0.80, 4.30, EKG SR without ST changes,   1. Continue Heparin drip, Coreg and Statin, Add Aspirin 81 mg/ day  2. NPO after midnight for cardiac cath in AM  2. Hypertension - Controlled with Coreg, norvasc and lisinopril. Continue torsemide, d/c furosemide. 3. Hyperlipidemia - Continue Atorvastatin - check FLP  4.acute diastolic chf-diuresis,reduce iv fluid   Assessment:     Hospital Problems  Date Reviewed: 4/29/2018          Codes Class Noted POA    Elevated troponin ICD-10-CM: R74.8  ICD-9-CM: 790.6  4/28/2018 Unknown        HTN (hypertension) (Chronic) ICD-10-CM: I10  ICD-9-CM: 401.9  9/17/2013 Yes        Renal insufficiency, mild (Chronic) ICD-10-CM: N28.9  ICD-9-CM: 593.9  9/17/2013 Yes        Mixed hyperlipidemia ICD-10-CM: N25.9  ICD-9-CM: 272.2  9/17/2013 Yes                   History of Present Illness: This patient has been seen and evaluated at the request of  for NSTEMI. Ms. Nya Russell has PMH of Hypertension, Hyperlipidemia. She began having a dry cough last Thursday. The cough would last about an hour and then resolve. Yesterday she developed SOB and chest pressure. She denies associated chest pain, palpitations, left arm or jaw pain, diaphoresis, n/v.  Her daughter called EMS. She was given aspirin in route to ER and her pain resolved. Initial trop I 0.8, now 4.30.   Of note, she had NST 2017 which was normal.      Past Medical History:     Past Medical History:   Diagnosis Date    Arthritis     Hypercholesteremia     Hypertension     Nerve pain          Social History:     Social History     Social History    Marital status: UNKNOWN     Spouse name: N/A    Number of children: N/A    Years of education: N/A     Social History Main Topics    Smoking status: Never Smoker    Smokeless tobacco: Never Used    Alcohol use Yes Comment: glass of wine very seldom    Drug use: No    Sexual activity: Not Asked     Other Topics Concern    None     Social History Narrative        Family History:   History reviewed. No pertinent family history. Medications: Allergies   Allergen Reactions    Other Medication Itching and Swelling     Apples, pears, strawberries, cherries, any type of berries,[place]ado,pears,peaches,kiwi. Patient states cherries make throat swell and the others make her throat itch.         Current Facility-Administered Medications   Medication Dose Route Frequency    heparin 25,000 units in D5W 250 ml infusion  10.834-25 Units/kg/hr IntraVENous TITRATE    0.9% sodium chloride infusion  100 mL/hr IntraVENous CONTINUOUS    sodium chloride (NS) flush 5-10 mL  5-10 mL IntraVENous Q8H    sodium chloride (NS) flush 5-10 mL  5-10 mL IntraVENous PRN    oxyCODONE IR (ROXICODONE) tablet 5 mg  5 mg Oral Q4H PRN    amLODIPine (NORVASC) tablet 10 mg  10 mg Oral DAILY    atorvastatin (LIPITOR) tablet 40 mg  40 mg Oral DAILY    carvedilol (COREG) tablet 6.25 mg  6.25 mg Oral BID WITH MEALS    furosemide (LASIX) tablet 20 mg  20 mg Oral DAILY    gabapentin (NEURONTIN) capsule 300 mg  300 mg Oral BID    lisinopril (PRINIVIL, ZESTRIL) tablet 20 mg  20 mg Oral DAILY    albuterol-ipratropium (DUO-NEB) 2.5 MG-0.5 MG/3 ML  3 mL Nebulization Q6H PRN    potassium chloride (KLOR-CON) tablet 10 mEq  10 mEq Oral DAILY    torsemide (DEMADEX) tablet 20 mg  20 mg Oral DAILY        Review Of Systems:       Constitutional: No fever, no chills, no weight loss, no night sweats   HEENT: No epistaxis, no nasal drainage, no difficulty in swallowing, no redness in eyes  Respiratory: + SOB with non-productive cough- No hemoptysis, pleurisy/chest pain, asthma or dyspnea on exertion, hemoptysis, pleurisy/chest pain, wheezing, dyspnea on exertion or emphysema  Cardiovascular: + Chest pressure, no chest pain, no chest pressure, no dyspnea, no pnd, no claudication no palpitations, no chronic leg edema, no syncope  Gastrointestinal: no abd pain, no vomiting, no diarrhea, no bleeding symptoms  Genitourinary: No urinary symptoms or hematuria  Integument/breast: No ulcers or rashes  Musculoskeletal: no muscle pain, no weakness  Neurological: No focal weakness, no seizures, no headaches  Behvioral/Psych: No anxiety, no depression       Physical Exam:     Visit Vitals    /75 (BP 1 Location: Left arm, BP Patient Position: At rest)    Pulse 91    Temp 98.3 °F (36.8 °C)    Resp 20    Wt 92.3 kg (203 lb 6.4 oz)    SpO2 100%    Breastfeeding No    BMI 33.33 kg/m2     BP Readings from Last 3 Encounters:   04/29/18 137/75   10/26/17 127/75   09/19/14 116/61     Pulse Readings from Last 3 Encounters:   04/29/18 91   10/26/17 87   09/19/14 88     Wt Readings from Last 3 Encounters:   04/29/18 92.3 kg (203 lb 6.4 oz)   10/26/17 97.5 kg (215 lb)   09/16/14 93.7 kg (206 lb 8 oz)       General:  alert, cooperative, no distress, appears stated age  Skin: Warm and dry, acyanotic, normal color. Head: Normocephalic, atraumatic. Eyes: Sclerae anicteric, conjunctivae without injection. Neck:  nontender, no nuchal rigidity, no masses, no stridor, no carotid bruit, no JVD  Lungs: Faint exp wheeze, right vase, no rhonchi , no rales  Heart:  regular rate and rhythm, S1, S2 normal, no murmur, click, rub or gallop  Abdomen:  abdomen is soft without significant tenderness, masses, organomegaly or guarding  Extremities:  extremities normal, atraumatic, no cyanosis or edema  Neurological: grossly intact. No focal abnormalities, moves all extremities well. Psychiatric Affect: The patient is awake, alert and oriented x3. Anne Pontiff is interactive and appropriate.    Data Review:     Recent Results (from the past 48 hour(s))   EKG, 12 LEAD, INITIAL    Collection Time: 04/28/18  5:35 PM   Result Value Ref Range    Ventricular Rate 104 BPM    Atrial Rate 104 BPM    P-R Interval 136 ms    QRS Duration 80 ms    Q-T Interval 350 ms    QTC Calculation (Bezet) 460 ms    Calculated P Axis 2 degrees    Calculated R Axis -38 degrees    Calculated T Axis 24 degrees    Diagnosis       Sinus tachycardia  Left axis deviation  Abnormal ECG  When compared with ECG of 02-SEP-2014 11:23,  Vent. rate has increased BY  48 BPM  QT has lengthened     CBC WITH AUTOMATED DIFF    Collection Time: 04/28/18  5:54 PM   Result Value Ref Range    WBC 11.6 4.6 - 13.2 K/uL    RBC 4.04 (L) 4.20 - 5.30 M/uL    HGB 12.0 12.0 - 16.0 g/dL    HCT 37.0 35.0 - 45.0 %    MCV 91.6 74.0 - 97.0 FL    MCH 29.7 24.0 - 34.0 PG    MCHC 32.4 31.0 - 37.0 g/dL    RDW 14.1 11.6 - 14.5 %    PLATELET 911 206 - 575 K/uL    MPV 11.3 9.2 - 11.8 FL    NEUTROPHILS 72 40 - 73 %    LYMPHOCYTES 17 (L) 21 - 52 %    MONOCYTES 7 3 - 10 %    EOSINOPHILS 4 0 - 5 %    BASOPHILS 0 0 - 2 %    ABS. NEUTROPHILS 8.5 (H) 1.8 - 8.0 K/UL    ABS. LYMPHOCYTES 1.9 0.9 - 3.6 K/UL    ABS. MONOCYTES 0.8 0.05 - 1.2 K/UL    ABS. EOSINOPHILS 0.4 0.0 - 0.4 K/UL    ABS.  BASOPHILS 0.0 0.0 - 0.1 K/UL    DF AUTOMATED     NT-PRO BNP    Collection Time: 04/28/18  5:54 PM   Result Value Ref Range    NT pro- 0 - 1800 PG/ML   D DIMER    Collection Time: 04/28/18  5:54 PM   Result Value Ref Range    D DIMER 1.04 (H) <0.46 ug/ml(FEU)   TROPONIN I    Collection Time: 04/28/18  5:54 PM   Result Value Ref Range    Troponin-I, Qt. 0.80 (H) 0.0 - 0.045 NG/ML   MAGNESIUM    Collection Time: 04/28/18  5:54 PM   Result Value Ref Range    Magnesium 2.1 1.6 - 2.6 mg/dL   METABOLIC PANEL, COMPREHENSIVE    Collection Time: 04/28/18  5:54 PM   Result Value Ref Range    Sodium 141 136 - 145 mmol/L    Potassium 3.8 3.5 - 5.5 mmol/L    Chloride 105 100 - 108 mmol/L    CO2 30 21 - 32 mmol/L    Anion gap 6 3.0 - 18 mmol/L    Glucose 116 (H) 74 - 99 mg/dL    BUN 12 7.0 - 18 MG/DL    Creatinine 1.02 0.6 - 1.3 MG/DL    BUN/Creatinine ratio 12 12 - 20      GFR est AA >60 >60 ml/min/1.73m2    GFR est non-AA 52 (L) >60 ml/min/1.73m2    Calcium 8.8 8.5 - 10.1 MG/DL    Bilirubin, total 1.0 0.2 - 1.0 MG/DL    ALT (SGPT) 27 13 - 56 U/L    AST (SGOT) 25 15 - 37 U/L    Alk. phosphatase 85 45 - 117 U/L    Protein, total 7.4 6.4 - 8.2 g/dL    Albumin 3.6 3.4 - 5.0 g/dL    Globulin 3.8 2.0 - 4.0 g/dL    A-G Ratio 0.9 0.8 - 1.7     LIPASE    Collection Time: 04/28/18  5:54 PM   Result Value Ref Range    Lipase 73 73 - 393 U/L   EKG, 12 LEAD, SUBSEQUENT    Collection Time: 04/28/18  9:51 PM   Result Value Ref Range    Ventricular Rate 95 BPM    Atrial Rate 95 BPM    P-R Interval 140 ms    QRS Duration 78 ms    Q-T Interval 382 ms    QTC Calculation (Bezet) 480 ms    Calculated P Axis 22 degrees    Calculated R Axis -37 degrees    Calculated T Axis 34 degrees    Diagnosis       Sinus rhythm with premature supraventricular complexes  Left axis deviation  Low voltage QRS  Abnormal ECG  When compared with ECG of 28-APR-2018 17:35,  premature supraventricular complexes are now present     CBC WITH AUTOMATED DIFF    Collection Time: 04/29/18  2:50 AM   Result Value Ref Range    WBC 8.4 4.6 - 13.2 K/uL    RBC 3.85 (L) 4.20 - 5.30 M/uL    HGB 11.3 (L) 12.0 - 16.0 g/dL    HCT 35.8 35.0 - 45.0 %    MCV 93.0 74.0 - 97.0 FL    MCH 29.4 24.0 - 34.0 PG    MCHC 31.6 31.0 - 37.0 g/dL    RDW 14.3 11.6 - 14.5 %    PLATELET 346 (L) 607 - 420 K/uL    MPV 11.4 9.2 - 11.8 FL    NEUTROPHILS 66 40 - 73 %    LYMPHOCYTES 22 21 - 52 %    MONOCYTES 8 3 - 10 %    EOSINOPHILS 4 0 - 5 %    BASOPHILS 0 0 - 2 %    ABS. NEUTROPHILS 5.5 1.8 - 8.0 K/UL    ABS. LYMPHOCYTES 1.9 0.9 - 3.6 K/UL    ABS. MONOCYTES 0.7 0.05 - 1.2 K/UL    ABS. EOSINOPHILS 0.3 0.0 - 0.4 K/UL    ABS.  BASOPHILS 0.0 0.0 - 0.1 K/UL    DF AUTOMATED     METABOLIC PANEL, BASIC    Collection Time: 04/29/18  2:50 AM   Result Value Ref Range    Sodium 142 136 - 145 mmol/L    Potassium 3.8 3.5 - 5.5 mmol/L    Chloride 108 100 - 108 mmol/L    CO2 27 21 - 32 mmol/L    Anion gap 7 3.0 - 18 mmol/L    Glucose 97 74 - 99 mg/dL    BUN 11 7.0 - 18 MG/DL    Creatinine 0.89 0.6 - 1.3 MG/DL    BUN/Creatinine ratio 12 12 - 20      GFR est AA >60 >60 ml/min/1.73m2    GFR est non-AA >60 >60 ml/min/1.73m2    Calcium 8.2 (L) 8.5 - 10.1 MG/DL   CARDIAC PANEL,(CK, CKMB & TROPONIN)    Collection Time: 04/29/18  2:50 AM   Result Value Ref Range     (H) 26 - 192 U/L    CK - MB 12.2 (H) <3.6 ng/ml    CK-MB Index 3.9 0.0 - 4.0 %    Troponin-I, Qt. 4.30 (HH) 0.0 - 0.045 NG/ML   CBC WITH AUTOMATED DIFF    Collection Time: 04/29/18  6:04 AM   Result Value Ref Range    WBC 8.6 4.6 - 13.2 K/uL    RBC 3.79 (L) 4.20 - 5.30 M/uL    HGB 11.2 (L) 12.0 - 16.0 g/dL    HCT 34.7 (L) 35.0 - 45.0 %    MCV 91.6 74.0 - 97.0 FL    MCH 29.6 24.0 - 34.0 PG    MCHC 32.3 31.0 - 37.0 g/dL    RDW 14.3 11.6 - 14.5 %    PLATELET 839 326 - 146 K/uL    MPV 10.7 9.2 - 11.8 FL    NEUTROPHILS 66 40 - 73 %    LYMPHOCYTES 19 (L) 21 - 52 %    MONOCYTES 11 (H) 3 - 10 %    EOSINOPHILS 4 0 - 5 %    BASOPHILS 0 0 - 2 %    ABS. NEUTROPHILS 5.6 1.8 - 8.0 K/UL    ABS. LYMPHOCYTES 1.6 0.9 - 3.6 K/UL    ABS. MONOCYTES 0.9 0.05 - 1.2 K/UL    ABS. EOSINOPHILS 0.4 0.0 - 0.4 K/UL    ABS. BASOPHILS 0.0 0.0 - 0.1 K/UL    DF AUTOMATED     PTT    Collection Time: 04/29/18  6:04 AM   Result Value Ref Range    aPTT 46.9 (H) 23.0 - 36.4 SEC         Intake/Output Summary (Last 24 hours) at 04/29/18 0851  Last data filed at 04/29/18 0700   Gross per 24 hour   Intake              800 ml   Output              600 ml   Net              200 ml       Cardiographics:   Nuclear Stress test 11/1/2017 -   Impression   Findings:   1. Post-stress imaging in short axis, horizontal and vertical long axis views reveals normal isotope uptake in all areas. 2. Resting images also show normal isotope uptake in all areas. 3. Gated images show normal left ventricular size, wall motion and systolic function. Ejection fraction is 78%. Diagnosis:   1. Normal scan.    2. No evidence of significant fixed or reversible defect suggesting ischemia or myocardial infarction noted from this nuclear study. 3. Low risk scan. ECG: SR with PAC, Left axis    Echocardiogram:   11/1/2017  SUMMARY:  Left ventricle: Systolic function was normal. Ejection fraction was  estimated to be 55 %. There were no regional wall motion abnormalities. There was mild concentric hypertrophy. Doppler parameters were consistent  with abnormal left ventricular relaxation (grade 1 diastolic dysfunction). Signed By: Bebe Gosselin, NP -C    April 29, 2018      I have independently evaluated taken history and examined the patient. All relevant labs and testing data's are reviewed. Care plan discussed and updated after review.   Negrito Simpson MD

## 2018-04-29 NOTE — ED NOTES
TRANSFER - OUT REPORT:    Verbal report given to Fredrick Vergara RN(name) on Thersa Siemens  being transferred to 2 S(unit) for routine progression of care   Report consisted of patients Situation, Background, Assessment and Recommendations(SBAR). Information from the following report(s) SBAR, ED Summary, Procedure Summary, MAR, Recent Results and Cardiac Rhythm NSR was reviewed with the receiving nurse. Opportunity for questions and clarification was provided.       Patient transported with:   Monitor  O2 @ 1 liters  Registered Nurse

## 2018-04-29 NOTE — ROUTINE PROCESS
ptt >180  No change in dose since bolus 0f 3000 units was just given and dose was increased by 200 units. Repeat ptt was requested for 18 :45 pm today.

## 2018-04-29 NOTE — ROUTINE PROCESS
[id number] Received report from [facility] ED RN of pt condition. 9238 Received Pt. Per stretcher  from ED. Pt is AOX 4 transferred to bed without a problem. Pt.  Educated on room equipments and call bell  when in need of help and assistance. .Pt. Educated on MD's  orders and plans for the evening. Pt. Verbalized understanding. Pt. Denies discomfort. Will continue to monitor Pt. Condition. 0100 pt resting comfortably in bed no sign of distress. 0300  Pt. Resting comfortably in bed not in distress. 0430  OOB to [facility] x1 assist to void. 0551  Pt. Troponin is 4.30 per Lab, notified. Dr Magdalena Moncada and MD ordered to start Pt. On heparin gtt per ACSI protocol. 0963  Started heparin gtt. Pt. Educated on Heparin gtt. Pt. Verbalized understanding. Verbal and bedside Shift changed report given to Bright Mayberry RN (oncoming RN) on Pt. Condition. Report consisted of patients Situation, History, Activities, intake/output,  Background, Assessment and Recommendations(SBAR). Information from the following report(s) Kardex, order Summary, Lab results and MAR was reviewed with the receiving nurse. Opportunity for questions and clarification was provided.

## 2018-04-29 NOTE — ROUTINE PROCESS
Bedside and Verbal shift change report given to Yas Watkins (oncoming nurse) by Oracio Veras (offgoing nurse). Report included the following information Kardex, Intake/Output and MAR.

## 2018-04-29 NOTE — PROGRESS NOTES
Albuterol-ipratropium (DUO-NEB) 2.5-0.5 mg/3 mL nebulizer was therapeutically interchanged for Albuterol-ipratropium (COMBIVENT) 103-18 mcg/actuation inhalation spray per the P&T Committee approved Therapeutic Interchanges Policy.     Eric Rasmussen [facility], Pharmacist  4/28/2018 11:06 PM

## 2018-04-29 NOTE — PROGRESS NOTES
Problem: Unstable angina/NSTEMI: Day of Admission/Day 1  Goal: Diagnostic Test/Procedures  Outcome: Progressing Towards Goal  Pt to prep for cardiac cath in the am.

## 2018-04-29 NOTE — PROGRESS NOTES
[facility]  [facility]    Patient: Roxanna Romero Age: 80 y.o. : 1935 MR#: [id number] SSN: [id number]  Date/Time: 2018 10:26 AM    Subjective/24-hour events:     No new issues overnight. Denies chest pain acutely. Daughter present at bedside. Assessment:   NSTEMI  Acute diastolic CHF  HTN  HLD  Obesity, BMI 33.8    Plan:  IV heparin/ASA/statin/b-blocker/ACE-I. Continue IV lasix. Cardiac cath in AM.  Discussed with patient and daughter at bedside. Case discussed with:  [x]Patient  [x]Family  []Nursing  []Case Management  DVT Prophylaxis:  []Lovenox  []Hep SQ  []SCDs  []Coumadin   [x]On Heparin gtt    Objective:   VS:   Visit Vitals    /75 (BP 1 Location: Left arm, BP Patient Position: At rest)    Pulse 91    Temp 98.3 °F (36.8 °C)    Resp 20    Ht 5' 5.35\" (1.66 m)    Wt 92.3 kg (203 lb 6.4 oz)    SpO2 100%    Breastfeeding No    BMI 33.48 kg/m2      Tmax/24hrs: Temp (24hrs), Av.1 °F (37.3 °C), Min:98.3 °F (36.8 °C), Max:99.6 °F (37.6 °C)    Intake/Output Summary (Last 24 hours) at 18 1026  Last data filed at 18 0945   Gross per 24 hour   Intake             1040 ml   Output              950 ml   Net               90 ml       General:  In NAD. Cardiovascular: RRR. Pulmonary:  Clear, no wheezes. Effort nonlabored. GI:  Abdomen soft, NTTP. Extremities:  Warm, no ischemia.   Neuro:  Awake and alert, motor nonfocal.    Labs:    Recent Results (from the past 24 hour(s))   EKG, 12 LEAD, INITIAL    Collection Time: 18  5:35 PM   Result Value Ref Range    Ventricular Rate 104 BPM    Atrial Rate 104 BPM    P-R Interval 136 ms    QRS Duration 80 ms    Q-T Interval 350 ms    QTC Calculation (Bezet) 460 ms    Calculated P Axis 2 degrees    Calculated R Axis -38 degrees    Calculated T Axis 24 degrees    Diagnosis       Sinus tachycardia  Left axis deviation  Abnormal ECG  When compared with ECG of 02-SEP-2014 11:23,  Vent. rate has increased BY  48 BPM  QT has lengthened     CBC WITH AUTOMATED DIFF    Collection Time: 04/28/18  5:54 PM   Result Value Ref Range    WBC 11.6 4.6 - 13.2 K/uL    RBC 4.04 (L) 4.20 - 5.30 M/uL    HGB 12.0 12.0 - 16.0 g/dL    HCT 37.0 35.0 - 45.0 %    MCV 91.6 74.0 - 97.0 FL    MCH 29.7 24.0 - 34.0 PG    MCHC 32.4 31.0 - 37.0 g/dL    RDW 14.1 11.6 - 14.5 %    PLATELET 748 022 - 587 K/uL    MPV 11.3 9.2 - 11.8 FL    NEUTROPHILS 72 40 - 73 %    LYMPHOCYTES 17 (L) 21 - 52 %    MONOCYTES 7 3 - 10 %    EOSINOPHILS 4 0 - 5 %    BASOPHILS 0 0 - 2 %    ABS. NEUTROPHILS 8.5 (H) 1.8 - 8.0 K/UL    ABS. LYMPHOCYTES 1.9 0.9 - 3.6 K/UL    ABS. MONOCYTES 0.8 0.05 - 1.2 K/UL    ABS. EOSINOPHILS 0.4 0.0 - 0.4 K/UL    ABS. BASOPHILS 0.0 0.0 - 0.1 K/UL    DF AUTOMATED     NT-PRO BNP    Collection Time: 04/28/18  5:54 PM   Result Value Ref Range    NT pro- 0 - 1800 PG/ML   D DIMER    Collection Time: 04/28/18  5:54 PM   Result Value Ref Range    D DIMER 1.04 (H) <0.46 ug/ml(FEU)   TROPONIN I    Collection Time: 04/28/18  5:54 PM   Result Value Ref Range    Troponin-I, Qt. 0.80 (H) 0.0 - 0.045 NG/ML   MAGNESIUM    Collection Time: 04/28/18  5:54 PM   Result Value Ref Range    Magnesium 2.1 1.6 - 2.6 mg/dL   METABOLIC PANEL, COMPREHENSIVE    Collection Time: 04/28/18  5:54 PM   Result Value Ref Range    Sodium 141 136 - 145 mmol/L    Potassium 3.8 3.5 - 5.5 mmol/L    Chloride 105 100 - 108 mmol/L    CO2 30 21 - 32 mmol/L    Anion gap 6 3.0 - 18 mmol/L    Glucose 116 (H) 74 - 99 mg/dL    BUN 12 7.0 - 18 MG/DL    Creatinine 1.02 0.6 - 1.3 MG/DL    BUN/Creatinine ratio 12 12 - 20      GFR est AA >60 >60 ml/min/1.73m2    GFR est non-AA 52 (L) >60 ml/min/1.73m2    Calcium 8.8 8.5 - 10.1 MG/DL    Bilirubin, total 1.0 0.2 - 1.0 MG/DL    ALT (SGPT) 27 13 - 56 U/L    AST (SGOT) 25 15 - 37 U/L    Alk.  phosphatase 85 45 - 117 U/L    Protein, total 7.4 6.4 - 8.2 g/dL    Albumin 3.6 3.4 - 5.0 g/dL    Globulin 3.8 2.0 - 4.0 g/dL    A-G Ratio 0.9 0.8 - 1.7     LIPASE    Collection Time: 04/28/18  5:54 PM   Result Value Ref Range    Lipase 73 73 - 393 U/L   EKG, 12 LEAD, SUBSEQUENT    Collection Time: 04/28/18  9:51 PM   Result Value Ref Range    Ventricular Rate 95 BPM    Atrial Rate 95 BPM    P-R Interval 140 ms    QRS Duration 78 ms    Q-T Interval 382 ms    QTC Calculation (Bezet) 480 ms    Calculated P Axis 22 degrees    Calculated R Axis -37 degrees    Calculated T Axis 34 degrees    Diagnosis       Sinus rhythm with premature supraventricular complexes  Left axis deviation  Low voltage QRS  Abnormal ECG  When compared with ECG of 28-APR-2018 17:35,  premature supraventricular complexes are now present     CBC WITH AUTOMATED DIFF    Collection Time: 04/29/18  2:50 AM   Result Value Ref Range    WBC 8.4 4.6 - 13.2 K/uL    RBC 3.85 (L) 4.20 - 5.30 M/uL    HGB 11.3 (L) 12.0 - 16.0 g/dL    HCT 35.8 35.0 - 45.0 %    MCV 93.0 74.0 - 97.0 FL    MCH 29.4 24.0 - 34.0 PG    MCHC 31.6 31.0 - 37.0 g/dL    RDW 14.3 11.6 - 14.5 %    PLATELET 134 (L) 618 - 420 K/uL    MPV 11.4 9.2 - 11.8 FL    NEUTROPHILS 66 40 - 73 %    LYMPHOCYTES 22 21 - 52 %    MONOCYTES 8 3 - 10 %    EOSINOPHILS 4 0 - 5 %    BASOPHILS 0 0 - 2 %    ABS. NEUTROPHILS 5.5 1.8 - 8.0 K/UL    ABS. LYMPHOCYTES 1.9 0.9 - 3.6 K/UL    ABS. MONOCYTES 0.7 0.05 - 1.2 K/UL    ABS. EOSINOPHILS 0.3 0.0 - 0.4 K/UL    ABS.  BASOPHILS 0.0 0.0 - 0.1 K/UL    DF AUTOMATED     METABOLIC PANEL, BASIC    Collection Time: 04/29/18  2:50 AM   Result Value Ref Range    Sodium 142 136 - 145 mmol/L    Potassium 3.8 3.5 - 5.5 mmol/L    Chloride 108 100 - 108 mmol/L    CO2 27 21 - 32 mmol/L    Anion gap 7 3.0 - 18 mmol/L    Glucose 97 74 - 99 mg/dL    BUN 11 7.0 - 18 MG/DL    Creatinine 0.89 0.6 - 1.3 MG/DL    BUN/Creatinine ratio 12 12 - 20      GFR est AA >60 >60 ml/min/1.73m2    GFR est non-AA >60 >60 ml/min/1.73m2    Calcium 8.2 (L) 8.5 - 10.1 MG/DL   CARDIAC PANEL,(CK, CKMB & TROPONIN)    Collection Time: 04/29/18  2:50 AM   Result Value Ref Range     (H) 26 - 192 U/L    CK - MB 12.2 (H) <3.6 ng/ml    CK-MB Index 3.9 0.0 - 4.0 %    Troponin-I, Qt. 4.30 (HH) 0.0 - 0.045 NG/ML   CBC WITH AUTOMATED DIFF    Collection Time: 04/29/18  6:04 AM   Result Value Ref Range    WBC 8.6 4.6 - 13.2 K/uL    RBC 3.79 (L) 4.20 - 5.30 M/uL    HGB 11.2 (L) 12.0 - 16.0 g/dL    HCT 34.7 (L) 35.0 - 45.0 %    MCV 91.6 74.0 - 97.0 FL    MCH 29.6 24.0 - 34.0 PG    MCHC 32.3 31.0 - 37.0 g/dL    RDW 14.3 11.6 - 14.5 %    PLATELET 879 080 - 863 K/uL    MPV 10.7 9.2 - 11.8 FL    NEUTROPHILS 66 40 - 73 %    LYMPHOCYTES 19 (L) 21 - 52 %    MONOCYTES 11 (H) 3 - 10 %    EOSINOPHILS 4 0 - 5 %    BASOPHILS 0 0 - 2 %    ABS. NEUTROPHILS 5.6 1.8 - 8.0 K/UL    ABS. LYMPHOCYTES 1.6 0.9 - 3.6 K/UL    ABS. MONOCYTES 0.9 0.05 - 1.2 K/UL    ABS. EOSINOPHILS 0.4 0.0 - 0.4 K/UL    ABS.  BASOPHILS 0.0 0.0 - 0.1 K/UL    DF AUTOMATED     PTT    Collection Time: 04/29/18  6:04 AM   Result Value Ref Range    aPTT 46.9 (H) 23.0 - 36.4 SEC       Signed By: Xavi Walsh MD     April 29, 2018 10:26 AM

## 2018-04-30 ENCOUNTER — HOME HEALTH ADMISSION (OUTPATIENT)
Dept: HOME HEALTH SERVICES | Facility: HOME HEALTH | Age: 83
End: 2018-04-30
Payer: MEDICARE

## 2018-04-30 VITALS
HEIGHT: 65 IN | DIASTOLIC BLOOD PRESSURE: 70 MMHG | TEMPERATURE: 98.8 F | WEIGHT: 200.4 LBS | SYSTOLIC BLOOD PRESSURE: 109 MMHG | BODY MASS INDEX: 33.39 KG/M2 | OXYGEN SATURATION: 97 % | HEART RATE: 72 BPM | RESPIRATION RATE: 18 BRPM

## 2018-04-30 LAB
ACT BLD: 147 SECS (ref 79–138)
ANION GAP SERPL CALC-SCNC: 4 MMOL/L (ref 3–18)
APTT PPP: 82.1 SEC (ref 23–36.4)
BUN SERPL-MCNC: 13 MG/DL (ref 7–18)
BUN/CREAT SERPL: 13 (ref 12–20)
CALCIUM SERPL-MCNC: 8.4 MG/DL (ref 8.5–10.1)
CHLORIDE SERPL-SCNC: 110 MMOL/L (ref 100–108)
CHOLEST SERPL-MCNC: 98 MG/DL
CO2 SERPL-SCNC: 30 MMOL/L (ref 21–32)
CREAT SERPL-MCNC: 1 MG/DL (ref 0.6–1.3)
ERYTHROCYTE [DISTWIDTH] IN BLOOD BY AUTOMATED COUNT: 14.4 % (ref 11.6–14.5)
GLUCOSE SERPL-MCNC: 106 MG/DL (ref 74–99)
HCT VFR BLD AUTO: 34.1 % (ref 35–45)
HDLC SERPL-MCNC: 48 MG/DL (ref 40–60)
HDLC SERPL: 2 {RATIO} (ref 0–5)
HGB BLD-MCNC: 11 G/DL (ref 12–16)
LDLC SERPL CALC-MCNC: 38.2 MG/DL (ref 0–100)
LIPID PROFILE,FLP: NORMAL
MCH RBC QN AUTO: 29.2 PG (ref 24–34)
MCHC RBC AUTO-ENTMCNC: 32.3 G/DL (ref 31–37)
MCV RBC AUTO: 90.5 FL (ref 74–97)
PLATELET # BLD AUTO: 138 K/UL (ref 135–420)
PMV BLD AUTO: 11.2 FL (ref 9.2–11.8)
POTASSIUM SERPL-SCNC: 4 MMOL/L (ref 3.5–5.5)
RBC # BLD AUTO: 3.77 M/UL (ref 4.2–5.3)
SODIUM SERPL-SCNC: 144 MMOL/L (ref 136–145)
TRIGL SERPL-MCNC: 59 MG/DL (ref ?–150)
VLDLC SERPL CALC-MCNC: 11.8 MG/DL
WBC # BLD AUTO: 8.2 K/UL (ref 4.6–13.2)

## 2018-04-30 PROCEDURE — 74011250636 HC RX REV CODE- 250/636: Performed by: INTERNAL MEDICINE

## 2018-04-30 PROCEDURE — C1894 INTRO/SHEATH, NON-LASER: HCPCS

## 2018-04-30 PROCEDURE — 80048 BASIC METABOLIC PNL TOTAL CA: CPT | Performed by: INTERNAL MEDICINE

## 2018-04-30 PROCEDURE — B2151ZZ FLUOROSCOPY OF LEFT HEART USING LOW OSMOLAR CONTRAST: ICD-10-PCS | Performed by: INTERNAL MEDICINE

## 2018-04-30 PROCEDURE — 74011000250 HC RX REV CODE- 250: Performed by: INTERNAL MEDICINE

## 2018-04-30 PROCEDURE — 85347 COAGULATION TIME ACTIVATED: CPT

## 2018-04-30 PROCEDURE — 74011250637 HC RX REV CODE- 250/637: Performed by: INTERNAL MEDICINE

## 2018-04-30 PROCEDURE — 85027 COMPLETE CBC AUTOMATED: CPT | Performed by: INTERNAL MEDICINE

## 2018-04-30 PROCEDURE — 80061 LIPID PANEL: CPT

## 2018-04-30 PROCEDURE — 74011636320 HC RX REV CODE- 636/320: Performed by: INTERNAL MEDICINE

## 2018-04-30 PROCEDURE — 36415 COLL VENOUS BLD VENIPUNCTURE: CPT

## 2018-04-30 PROCEDURE — 4A023N7 MEASUREMENT OF CARDIAC SAMPLING AND PRESSURE, LEFT HEART, PERCUTANEOUS APPROACH: ICD-10-PCS | Performed by: INTERNAL MEDICINE

## 2018-04-30 PROCEDURE — 74011250637 HC RX REV CODE- 250/637: Performed by: NURSE PRACTITIONER

## 2018-04-30 PROCEDURE — B2111ZZ FLUOROSCOPY OF MULTIPLE CORONARY ARTERIES USING LOW OSMOLAR CONTRAST: ICD-10-PCS | Performed by: INTERNAL MEDICINE

## 2018-04-30 PROCEDURE — 85730 THROMBOPLASTIN TIME PARTIAL: CPT | Performed by: FAMILY MEDICINE

## 2018-04-30 RX ORDER — EPTIFIBATIDE 2 MG/ML
180 INJECTION, SOLUTION INTRAVENOUS ONCE
Status: DISCONTINUED | OUTPATIENT
Start: 2018-04-30 | End: 2018-04-30 | Stop reason: HOSPADM

## 2018-04-30 RX ORDER — HEPARIN SODIUM 1000 [USP'U]/ML
1000-10000 INJECTION, SOLUTION INTRAVENOUS; SUBCUTANEOUS
Status: DISCONTINUED | OUTPATIENT
Start: 2018-04-30 | End: 2018-04-30 | Stop reason: HOSPADM

## 2018-04-30 RX ORDER — FENTANYL CITRATE 50 UG/ML
12.5-1 INJECTION, SOLUTION INTRAMUSCULAR; INTRAVENOUS
Status: DISCONTINUED | OUTPATIENT
Start: 2018-04-30 | End: 2018-04-30 | Stop reason: HOSPADM

## 2018-04-30 RX ORDER — HEPARIN SODIUM 200 [USP'U]/100ML
500 INJECTION, SOLUTION INTRAVENOUS ONCE
Status: DISCONTINUED | OUTPATIENT
Start: 2018-04-30 | End: 2018-04-30 | Stop reason: HOSPADM

## 2018-04-30 RX ORDER — EPTIFIBATIDE 0.75 MG/ML
2 INJECTION, SOLUTION INTRAVENOUS CONTINUOUS
Status: DISCONTINUED | OUTPATIENT
Start: 2018-04-30 | End: 2018-04-30

## 2018-04-30 RX ORDER — VERAPAMIL HYDROCHLORIDE 2.5 MG/ML
2.5-5 INJECTION, SOLUTION INTRAVENOUS ONCE
Status: DISCONTINUED | OUTPATIENT
Start: 2018-04-30 | End: 2018-04-30 | Stop reason: HOSPADM

## 2018-04-30 RX ORDER — AMLODIPINE BESYLATE 5 MG/1
5 TABLET ORAL DAILY
Status: DISCONTINUED | OUTPATIENT
Start: 2018-05-01 | End: 2018-04-30 | Stop reason: HOSPADM

## 2018-04-30 RX ORDER — SODIUM CHLORIDE 0.9 % (FLUSH) 0.9 %
5-10 SYRINGE (ML) INJECTION AS NEEDED
Status: DISCONTINUED | OUTPATIENT
Start: 2018-04-30 | End: 2018-04-30 | Stop reason: HOSPADM

## 2018-04-30 RX ORDER — GUAIFENESIN 100 MG/5ML
81 LIQUID (ML) ORAL DAILY
Qty: 30 TAB | Refills: 0 | Status: SHIPPED | OUTPATIENT
Start: 2018-05-01

## 2018-04-30 RX ORDER — MIDAZOLAM HYDROCHLORIDE 1 MG/ML
.5-2 INJECTION, SOLUTION INTRAMUSCULAR; INTRAVENOUS
Status: DISCONTINUED | OUTPATIENT
Start: 2018-04-30 | End: 2018-04-30 | Stop reason: HOSPADM

## 2018-04-30 RX ORDER — LIDOCAINE HYDROCHLORIDE 10 MG/ML
1-30 INJECTION, SOLUTION EPIDURAL; INFILTRATION; INTRACAUDAL; PERINEURAL
Status: DISCONTINUED | OUTPATIENT
Start: 2018-04-30 | End: 2018-04-30 | Stop reason: HOSPADM

## 2018-04-30 RX ORDER — SODIUM CHLORIDE 0.9 % (FLUSH) 0.9 %
5-10 SYRINGE (ML) INJECTION EVERY 8 HOURS
Status: DISCONTINUED | OUTPATIENT
Start: 2018-04-30 | End: 2018-04-30 | Stop reason: HOSPADM

## 2018-04-30 RX ADMIN — LIDOCAINE HYDROCHLORIDE 20 ML: 10 INJECTION, SOLUTION EPIDURAL; INFILTRATION; INTRACAUDAL; PERINEURAL at 08:40

## 2018-04-30 RX ADMIN — POTASSIUM CHLORIDE 10 MEQ: 750 TABLET, EXTENDED RELEASE ORAL at 11:21

## 2018-04-30 RX ADMIN — SODIUM CHLORIDE 25 ML/HR: 900 INJECTION, SOLUTION INTRAVENOUS at 03:01

## 2018-04-30 RX ADMIN — MIDAZOLAM HYDROCHLORIDE 1 MG: 1 INJECTION, SOLUTION INTRAMUSCULAR; INTRAVENOUS at 08:39

## 2018-04-30 RX ADMIN — Medication 5 ML: at 06:00

## 2018-04-30 RX ADMIN — IOPAMIDOL 50 ML: 612 INJECTION, SOLUTION INTRAVENOUS at 09:08

## 2018-04-30 RX ADMIN — ATORVASTATIN CALCIUM 40 MG: 40 TABLET, FILM COATED ORAL at 11:20

## 2018-04-30 RX ADMIN — LISINOPRIL 20 MG: 20 TABLET ORAL at 11:21

## 2018-04-30 RX ADMIN — CARVEDILOL 6.25 MG: 6.25 TABLET, FILM COATED ORAL at 11:20

## 2018-04-30 RX ADMIN — ASPIRIN 81 MG 81 MG: 81 TABLET ORAL at 07:48

## 2018-04-30 RX ADMIN — FENTANYL CITRATE 25 MCG: 50 INJECTION INTRAMUSCULAR; INTRAVENOUS at 08:38

## 2018-04-30 RX ADMIN — GABAPENTIN 300 MG: 300 CAPSULE ORAL at 11:21

## 2018-04-30 RX ADMIN — HEPARIN SODIUM 600 UNITS/HR: 10000 INJECTION, SOLUTION INTRAVENOUS at 07:15

## 2018-04-30 NOTE — PROGRESS NOTES
Pt transported from room 214 and taken directly to cardiac cath lab table, pt did not stop in holding area pre procedure but will come to holding area for post op.

## 2018-04-30 NOTE — PROCEDURES
[facility]    Debbie Peña  MR#: [id number]  : 1935  ACCOUNT #: [de-identified]   DATE OF SERVICE: 2018    SURGEON:  Chad Kidd M.D.    NAME OF PROCEDURE:  Left heart catheterization, LV angiogram, coronary angiogram.    INDICATION:  Non-ST elevation myocardial infarction. ENTRY:  Right common femoral artery. COMPLICATIONS:  None. SEDATION:  Moderate sedation was obtained using 1 mg of Versed and 25 mcg of fentanyl. Total sedation time was 32 minutes. PROCEDURE IN DETAIL:  [facility] and subsequently ruled in for non-ST elevation myocardial infarction. She was brought to cardiac catheterization laboratory to further evaluate CAD. Written informed consent was obtained from the patient. The patient was prepped and draped in a sterile fashion. 1% lidocaine was injected in the right groin and right common femoral artery was accessed using a 4-Portuguese arterial sheath without any complication. Right coronary angiogram was performed using a JR4 Portuguese catheter. We were unable to cannulate the left coronary artery  due to dilated aorta. At this time, the 4 [place] sheath was exchanged over a wire for a 6 [place] sheath. An XB LED 4.5 guide was advanced over wire and positioned at the level of left main artery. Left coronary angiogram was then performed. Following this, supporting catheter and wire were removed. Left heart catheterization, LV angiogram was performed using a pigtail catheter. Following this, we then performed a selective right common femoral angiogram following which we then deployed an Angio-Seal vascular closure device with excellent hemostasis. FINDINGS:    The left main has wall irregularities. It bifurcates into left anterior descending artery and circumflex artery.   Left anterior descending artery has diffuse wall irregularities with mildly sluggish flow suggestive of microvascular disease. Diagonal 1 and diagonal 2 artery is a small caliber vessels. Circumflex artery is nondominant with wall irregularities. Right coronary artery is dominant, medium to large caliber vessel with proximal 30% stenosis. Mid to distal right coronary artery is patent. It bifurcates into right posterolateral artery and posterior descending artery which are patent. Left ventricular ejection fraction is 40-45% with global hypokinesis. Left ventricular end diastolic pressure was 20 mmHg. No LV aortic gradient was noted on pullback. CONCLUSION:  Mild LV dysfunction. Nonsignificant CAD. Sluggish flow suggestive of microvascular disease. The patient is advised to be on intense medical management and risk factor modification.       MD CARLOS Day / HENRY  D: 04/30/2018 09:19     T: 04/30/2018 09:39  JOB #: [id number]

## 2018-04-30 NOTE — PROGRESS NOTES
Reason for Admission:  ELEVATED TROPOINION                   RRAT Score:  18                Do you (patient/family) have any concerns for transition/discharge? FAMILY REQUESTING A HOSPITAL BED, ROLLATOR, NOT OF THESE ARE RECOMMENDED AT THIS TIME. PT WILL BE REFERRED FOR HOME HEALTH TO ASSIST THIS PT WITH FURTHER EVALUATION TO QUALIFY. Plan for utilizing home health: TO ASSIST WITH EDUCATION, EVALUATION AND PREVENTION SERVICES         Likelihood of readmission? MED              Transition of Care Plan:  PT WILL RETURN HOME WHERE SHE LIVES WITH HER ADULT DAUGHTER AND GRANDCHILDREN WITH HOME HEALTH SERVICES. PT'S DAUGHTER WILL TRANSPORT THIS PT HOME. This pt was provided an [facility] for [address], pt and family chose [facility], message left for Liaison, referral placed in the referral que for services. Pt's daughter will provide transportation and support for this pt to return home.

## 2018-04-30 NOTE — ROUTINE PROCESS
TRANSFER - OUT REPORT:    Verbal report given to Surekha Bass RN(name) on [place] Mantel  being transferred to [facility](unit) for routine progression of care       Report consisted of patients Situation, Background, Assessment and   Recommendations(SBAR). Information from the following report(s) SBAR, Kardex, Procedure Summary and MAR was reviewed with the receiving nurse. Lines:   Peripheral IV 04/28/18 Right Antecubital (Active)   Site Assessment Clean, dry, & intact 4/29/2018  7:40 PM   Phlebitis Assessment 0 4/29/2018  7:40 PM   Infiltration Assessment 0 4/29/2018  7:40 PM   Dressing Status Clean, dry, & intact 4/29/2018  7:40 PM   Dressing Type Transparent 4/29/2018  7:40 PM   Hub Color/Line Status Patent; Flushed;Pink 4/29/2018  7:40 PM   Action Taken Open ports on tubing capped 4/29/2018  7:40 PM   Alcohol Cap Used Yes 4/29/2018  7:40 PM        Opportunity for questions and clarification was provided.       Patient transported with:   [place]

## 2018-04-30 NOTE — PROGRESS NOTES
TRANSFER - IN REPORT:    Verbal report received from Cali Aceves on Aidan Padilla  being received from [facility]  for routine post - op      Report consisted of patients Situation, Background, Assessment and   Recommendations(SBAR). Information from the following report(s) SBAR, Procedure Summary and MAR was reviewed with the receiving nurse. Opportunity for questions and clarification was provided. Assessment completed upon patients arrival to unit and care assumed.

## 2018-04-30 NOTE — ROUTINE PROCESS
Bedside and Verbal shift change report given to Reyna Awan RN (oncoming nurse) by Wolf Cullen (offgoing nurse). Report included the following information SBAR, Kardex, MAR and Recent Results.     SITUATION:    Code Status: Full Code   Reason for Admission: Elevated troponin    [facility] day: 2   Problem List:       Hospital Problems  Date Reviewed: 4/29/2018          Codes Class Noted POA    Non-STEMI (non-ST elevated myocardial infarction) ([facility] [address] ICD-10-CM: I21.4  ICD-9-CM: 410.70  4/29/2018 Unknown        Acute diastolic CHF (congestive heart failure) (HCC) ICD-10-CM: I50.31  ICD-9-CM: 428.31, 428.0  4/29/2018 Unknown        Elevated troponin ICD-10-CM: R74.8  ICD-9-CM: 790.6  4/28/2018 Unknown        HTN (hypertension) (Chronic) ICD-10-CM: I10  ICD-9-CM: 401.9  9/17/2013 Yes        Renal insufficiency, mild (Chronic) ICD-10-CM: N28.9  ICD-9-CM: 593.9  9/17/2013 Yes        Mixed hyperlipidemia ICD-10-CM: E78.2  ICD-9-CM: 272.2  9/17/2013 Yes              BACKGROUND:    Past Medical History:   Past Medical History:   Diagnosis Date    Arthritis     Hypercholesteremia     Hypertension     Nerve pain          Patient taking anticoagulants yes     ASSESSMENT:    Changes in Assessment Throughout Shift: none     Patient has Central Line: no Reasons if yes: n/a   Patient has Phillip Cath: no Reasons if yes: n/a      Last Vitals:     Vitals:    04/29/18 1939 04/29/18 2324 04/30/18 0302 04/30/18 0644   BP: 104/66 110/61 96/58    Pulse: 85 88 75    Resp: 20 20 18    Temp: 98.1 °F (36.7 °C) 99.2 °F (37.3 °C) 97.6 °F (36.4 °C)    SpO2: 99% 99% 99%    Weight:    90.9 kg (200 lb 6.4 oz)   Height:            IV and DRAINS (will only show if present)   Peripheral IV 04/28/18 Right Antecubital-Site Assessment: Clean, dry, & intact     WOUND (if present)   Wound Type:  none   Dressing present Dressing Present : No   Wound Concerns/Notes:  none     PAIN    Pain Assessment    Pain Intensity 1: 0 (04/30/18 9842) Patient Stated Pain Goal: 0  o Interventions for Pain:  none  o Intervention effective: n/a  o Time of last intervention: n/a   o Reassessment Completed: yes      Last 3 Weights:  Last 3 Recorded Weights in this Encounter    04/29/18 0424 04/30/18 0644   Weight: 92.3 kg (203 lb 6.4 oz) 90.9 kg (200 lb 6.4 oz)     Weight change: -1.361 kg (-3 lb)     INTAKE/OUPUT    Current Shift:      Last three shifts: 04/28 1901 - 04/30 0700  In: 1520 [P.O.:720; I.V.:800]  Out: 950 [Urine:950]     LAB RESULTS     Recent Labs      04/29/18   0604  04/29/18   0250  04/28/18   1754   WBC  8.6  8.4  11.6   HGB  11.2*  11.3*  12.0   HCT  34.7*  35.8  37.0   PLT  135  132*  141        Recent Labs      04/29/18   0250  04/28/18   1754   NA  142  141   K  3.8  3.8   GLU  97  116*   BUN  11  12   CREA  0.89  1.02   CA  8.2*  8.8   MG   --   2.1       RECOMMENDATIONS AND DISCHARGE PLANNING     1. Pending tests/procedures/ Plan of Care or Other Needs: cardiac cath today     2. Discharge plan for patient and Needs/Barriers: home    3. Estimated Discharge Date: TBD Posted on Whiteboard in Patients Room: no      4. The patient's care plan was reviewed with the oncoming nurse. \"HEALS\" SAFETY CHECK      Fall Risk    Total Score: 3    Safety Measures: Safety Measures: Bed/Chair-Wheels locked, Bed in low position, Call light within reach    A safety check occurred in the patient's room between off going nurse and oncoming nurse listed above.     The safety check included the below items  Area Items   H  High Alert Medications - Verify all high alert medication drips (heparin, PCA, etc.)   E  Equipment - Suction is set up for ALL patients (with yanker)  - Red plugs utilized for all equipment (IV pumps, etc.)  - WOWs wiped down at end of shift.  - Room stocked with oxygen, suction, and other unit-specific supplies   A  Alarms - Bed alarm is set for fall risk patients  - Ensure chair alarm is in place and activated if patient is up in a chair   L  Lines - Check IV for any infiltration  - Phillip bag is empty if patient has a Phillip   - Tubing and IV bags are labeled   S  Safety   - Room is clean, patient is clean, and equipment is clean. - Hallways are clear from equipment besides carts. - Fall bracelet on for fall risk patients  - Ensure room is clear and free of clutter  - Suction is set up for ALL patients (with yanker)  - Hallways are clear from equipment besides carts.    - Isolation precautions followed, supplies available outside room, sign posted     Rani Rodriguez

## 2018-04-30 NOTE — DISCHARGE INSTRUCTIONS
DISCHARGE SUMMARY from Nurse    PATIENT INSTRUCTIONS:    After general anesthesia or intravenous sedation, for 24 hours or while taking prescription Narcotics:  · Limit your activities  · Do not drive and operate hazardous machinery  · Do not make important personal or business decisions  · Do  not drink alcoholic beverages  · If you have not urinated within 8 hours after discharge, please contact your surgeon on call. Report the following to your surgeon:  · Excessive pain, swelling, redness or odor of or around the surgical area  · Temperature over 100.5  · Nausea and vomiting lasting longer than 4 hours or if unable to take medications  · Any signs of decreased circulation or nerve impairment to extremity: change in color, persistent  numbness, tingling, coldness or increase pain  · Any questions    What to do at Home:  Recommended activity: Activity as tolerated. If you experience any of the following symptoms chest pain, nausea, vomiting, dizziness, shortness of breath, and or spontaneous bleeding, please follow up with your primary care provider or seek emergency assistance. *  Please give a list of your current medications to your Primary Care Provider. *  Please update this list whenever your medications are discontinued, doses are      changed, or new medications (including over-the-counter products) are added. *  Please carry medication information at all times in case of emergency situations. These are general instructions for a healthy lifestyle:    No smoking/ No tobacco products/ Avoid exposure to second hand smoke  Surgeon General's Warning:  Quitting smoking now greatly reduces serious risk to your health.     Obesity, smoking, and sedentary lifestyle greatly increases your risk for illness    A healthy diet, regular physical exercise & weight monitoring are important for maintaining a healthy lifestyle    You may be retaining fluid if you have a history of heart failure or if you experience any of the following symptoms:  Weight gain of 3 pounds or more overnight or 5 pounds in a week, increased swelling in our hands or feet or shortness of breath while lying flat in bed. Please call your doctor as soon as you notice any of these symptoms; do not wait until your next office visit. Recognize signs and symptoms of STROKE:    F-face looks uneven    A-arms unable to move or move unevenly    S-speech slurred or non-existent    T-time-call 911 as soon as signs and symptoms begin-DO NOT go       Back to bed or wait to see if you get better-TIME IS BRAIN. Warning Signs of HEART ATTACK     Call 911 if you have these symptoms:   Chest discomfort. Most heart attacks involve discomfort in the center of the chest that lasts more than a few minutes, or that goes away and comes back. It can feel like uncomfortable pressure, squeezing, fullness, or pain.  Discomfort in other areas of the upper body. Symptoms can include pain or discomfort in one or both arms, the back, neck, jaw, or stomach.  Shortness of breath with or without chest discomfort.  Other signs may include breaking out in a cold sweat, nausea, or lightheadedness. Don't wait more than five minutes to call 911 - MINUTES MATTER! Fast action can save your life. Calling 911 is almost always the fastest way to get lifesaving treatment. Emergency Medical Services staff can begin treatment when they arrive -- up to an hour sooner than if someone gets to the hospital by car. [employer] Activation    Thank you for requesting access to [employer]. Please follow the instructions below to securely access and download your online medical record. [employer] allows you to send messages to your doctor, view your test results, renew your prescriptions, schedule appointments, and more. How Do I Sign Up? 1. In your internet browser, go to [email or web address]  2. Click on the First Time User? Click Here link in the Sign In box.  You will be redirect to the New Member Sign Up page. 3. Enter your [employer] Access Code exactly as it appears below. You will not need to use this code after youve completed the sign-up process. If you do not sign up before the expiration date, you must request a new code. [employer] Access Code: [email or web address]  Expires: 2018  5:27 PM (This is the date your [employer] access code will )    4. Enter the last four digits of your Social Security Number (xxxx) and Date of Birth (mm/dd/yyyy) as indicated and click Submit. You will be taken to the next sign-up page. 5. Create a [employer] ID. This will be your [employer] login ID and cannot be changed, so think of one that is secure and easy to remember. 6. Create a [employer] password. You can change your password at any time. 7. Enter your Password Reset Question and Answer. This can be used at a later time if you forget your password. 8. Enter your e-mail address. You will receive e-mail notification when new information is available in [address] 9. Click Sign Up. You can now view and download portions of your medical record. 10. Click the Download Summary menu link to download a portable copy of your medical information. Additional Information    If you have questions, please visit the Frequently Asked Questions section of the [employer] website at [email or web address] Remember, [employer] is NOT to be used for urgent needs. For medical emergencies, dial 911. Patient armband removed and shredded      The discharge information has been reviewed with the patient. The patient verbalized understanding. Discharge medications reviewed with the patient and appropriate educational materials and side effects teaching were provided.   ___________________________________________________________________________________________________________________________________

## 2018-04-30 NOTE — HOME CARE
[facility] received referral for PT, OT and skilled nursing for disease education and medication management - discharge orders noted. Referral completed by VIVEK Kendrick LPN intake nurse - TOÑO Lara LPN

## 2018-04-30 NOTE — PROGRESS NOTES
[facility]    Patient: Jairon Lynn Age: 80 y.o. : 1935 MR#: [id number] SSN: [id number]  Date/Time: 2018 10:58 AM    Subjective/24-hour events:     Just recently returned to floor from cath holding. No acute chest pain or SOB. Daughter present at bedside. Assessment:   NSTEMI  Insignificant native CAD   Acute diastolic CHF  HTN  HLD  Obesity, BMI 33.8    Plan:  Cath results noted. Recommendation for intensive medical management of insignificant CAD and risk factor modification. Mobilize as tolerated as soon as able. Disposition when cleared from cardiology perspective. Case discussed with:  [x]Patient  [x]Family  []Nursing  []Case Management  DVT Prophylaxis:  []Lovenox  []Hep SQ  []SCDs  []Coumadin   [x]On Heparin gtt    Objective:   VS:   Visit Vitals    /62    Pulse 64    Temp 97.6 °F (36.4 °C)    Resp 18    Ht 5' 5.35\" (1.66 m)    Wt 90.9 kg (200 lb 6.4 oz)    SpO2 99%    Breastfeeding No    BMI 32.99 kg/m2      Tmax/24hrs: Temp (24hrs), Av.6 °F (37 °C), Min:97.6 °F (36.4 °C), Max:99.9 °F (37.7 °C)      Intake/Output Summary (Last 24 hours) at 18 1058  Last data filed at 18 0919   Gross per 24 hour   Intake              800 ml   Output                0 ml   Net              800 ml       General:  In NAD. Cardiovascular: RRR. Pulmonary:  Clear, no wheezes. Effort nonlabored. GI:  Abdomen soft, NTTP. Extremities:  Warm, no ischemia.   Neuro:  Awake and alert, motor nonfocal.    Labs:    Recent Results (from the past 24 hour(s))   PTT    Collection Time: 18  1:45 PM   Result Value Ref Range    aPTT >180.0 (HH) 23.0 - 36.4 SEC   PTT    Collection Time: 18  6:35 PM   Result Value Ref Range    aPTT >180.0 (HH) 23.0 - 36.4 SEC   LIPID PANEL    Collection Time: 18 12:45 AM   Result Value Ref Range    LIPID PROFILE          Cholesterol, total 98 <200 MG/DL    Triglyceride 59 <150 MG/DL    HDL Cholesterol 48 40 - 60 MG/DL    LDL, calculated 38.2 0 - 100 MG/DL    VLDL, calculated 11.8 MG/DL    CHOL/HDL Ratio 2.0 0 - 5.0     PTT    Collection Time: 04/30/18  4:24 AM   Result Value Ref Range    aPTT 82.1 (H) 23.0 - 36.4 SEC   CBC W/O DIFF    Collection Time: 04/30/18  8:40 AM   Result Value Ref Range    WBC 8.2 4.6 - 13.2 K/uL    RBC 3.77 (L) 4.20 - 5.30 M/uL    HGB 11.0 (L) 12.0 - 16.0 g/dL    HCT 34.1 (L) 35.0 - 45.0 %    MCV 90.5 74.0 - 97.0 FL    MCH 29.2 24.0 - 34.0 PG    MCHC 32.3 31.0 - 37.0 g/dL    RDW 14.4 11.6 - 14.5 %    PLATELET 684 054 - 712 K/uL    MPV 11.2 9.2 - 08.6 FL   METABOLIC PANEL, BASIC    Collection Time: 04/30/18  8:40 AM   Result Value Ref Range    Sodium 144 136 - 145 mmol/L    Potassium 4.0 3.5 - 5.5 mmol/L    Chloride 110 (H) 100 - 108 mmol/L    CO2 30 21 - 32 mmol/L    Anion gap 4 3.0 - 18 mmol/L    Glucose 106 (H) 74 - 99 mg/dL    BUN 13 7.0 - 18 MG/DL    Creatinine 1.00 0.6 - 1.3 MG/DL    BUN/Creatinine ratio 13 12 - 20      GFR est AA >60 >60 ml/min/1.73m2    GFR est non-AA 53 (L) >60 ml/min/1.73m2    Calcium 8.4 (L) 8.5 - 10.1 MG/DL   POC ACTIVATED CLOTTING TIME    Collection Time: 04/30/18  8:45 AM   Result Value Ref Range    Activated Clotting Time (POC) 147 (H) 79 - 138 SECS       Signed By: Elian Uriarte MD     April 30, 2018 10:58 AM

## 2018-04-30 NOTE — PROGRESS NOTES
Nutrition:    Nutrition risk referral received from RN screen. Patient denies unintentional weight loss prior to admission and denies any change in po intake due to decreased appetite. Good meal intake 75%-100%. Full nutrition assessment is not indicated at this time. Will re-screen as appropriate.      Ansley Conde RD

## 2018-04-30 NOTE — PROGRESS NOTES
Pt returned to bed 214 from cath lab, awake alert in no distress and voicing no c/o pain. R femoral dressing dry, intact. Limbs warm, Dorsalis Pedis pulse strong, even bilaterally. Capillary refill less than 2 seconds. Post op instructions reviewed with patient.

## 2018-04-30 NOTE — PROGRESS NOTES
[facility]      CARDIOLOGY PROGRESS NOTE  RECS:  1. NSTEMI - Trop I 0.80, 4.30, EKG SR without ST changes,   1. Continue Heparin drip, Coreg and Statin, Add Aspirin 81 mg/ day  2. Will proceed with [facility]  2. Hypertension - Controlled with Coreg, norvasc and lisinopril. Continue torsemide, d/c furosemide. 3. Hyperlipidemia - Continue Atorvastatin - check FLP             4.  acute diastolic chf- improving    ASSESSMENT:  Hospital Problems  Date Reviewed: 4/29/2018          Codes Class Noted POA    Non-STEMI (non-ST elevated myocardial infarction) ([facility] [address] ICD-10-CM: I21.4  ICD-9-CM: 410.70  4/29/2018 Unknown        Acute diastolic CHF (congestive heart failure) (HCC) ICD-10-CM: I50.31  ICD-9-CM: 428.31, 428.0  4/29/2018 Unknown        Elevated troponin ICD-10-CM: R74.8  ICD-9-CM: 790.6  4/28/2018 Unknown        HTN (hypertension) (Chronic) ICD-10-CM: I10  ICD-9-CM: 401.9  9/17/2013 Yes        Renal insufficiency, mild (Chronic) ICD-10-CM: N28.9  ICD-9-CM: 593.9  9/17/2013 Yes        Mixed hyperlipidemia ICD-10-CM: M42.8  ICD-9-CM: 272.2  9/17/2013 Yes                SUBJECTIVE:  No CP or SOB    OBJECTIVE:    VS:   Visit Vitals    BP 96/58    Pulse 75    Temp 97.6 °F (36.4 °C)    Resp 18    Ht 5' 5.35\" (1.66 m)    Wt 90.9 kg (200 lb 6.4 oz)    SpO2 99%    Breastfeeding No    BMI 32.99 kg/m2         Intake/Output Summary (Last 24 hours) at 04/30/18 0829  Last data filed at 04/29/18 2101   Gross per 24 hour   Intake              840 ml   Output              350 ml   Net              490 ml     TELE: normal sinus rhythm    General: in no apparent distress  HENT: Normocephalic, atraumatic. Normal external eye.   Neck :  JVD difficult to assess due to obesity  Cardiac:  regular rate and rhythm, S1, S2 normal, no murmur, click, rub or gallop  Lungs: clear to auscultation bilaterally  Abdomen: Soft, nontender, no masses  Extremities:  No edema      Labs: Results:       Chemistry Recent Labs      04/29/18   0250  04/28/18   1754   GLU  97  116*   NA  142  141   K  3.8  3.8   CL  108  105   CO2  27  30   BUN  11  12   CREA  0.89  1.02   CA  8.2*  8.8   AGAP  7  6   BUCR  12  12   AP   --   85   TP   --   7.4   ALB   --   3.6   GLOB   --   3.8   AGRAT   --   0.9      CBC w/Diff Recent Labs      04/29/18   0604  04/29/18   0250  04/28/18   1754   WBC  8.6  8.4  11.6   RBC  3.79*  3.85*  4.04*   HGB  11.2*  11.3*  12.0   HCT  34.7*  35.8  37.0   PLT  135  132*  141   GRANS  66  66  72   LYMPH  19*  22  17*   EOS  4  4  4      Cardiac Enzymes Recent Labs      04/29/18   0250   CPK  313*   CKND1  3.9      Coagulation Recent Labs      04/30/18   0424  04/29/18   1835   APTT  82.1*  >180.0*       Lipid Panel Lab Results   Component Value Date/Time    Cholesterol, total 98 04/30/2018 12:45 AM    HDL Cholesterol 48 04/30/2018 12:45 AM    LDL, calculated 38.2 04/30/2018 12:45 AM    VLDL, calculated 11.8 04/30/2018 12:45 AM    Triglyceride 59 04/30/2018 12:45 AM    CHOL/HDL Ratio 2.0 04/30/2018 12:45 AM      BNP No results for input(s): BNPP in the last 72 hours.    Liver Enzymes Recent Labs      04/28/18   1754   TP  7.4   ALB  3.6   AP  85   SGOT  25      Thyroid Studies No results found for: T4, T3U, TSH, TSHEXT           Britton Boas, MD

## 2018-05-01 ENCOUNTER — HOME CARE VISIT (OUTPATIENT)
Dept: SCHEDULING | Facility: HOME HEALTH | Age: 83
End: 2018-05-01
Payer: MEDICARE

## 2018-05-01 ENCOUNTER — HOME CARE VISIT (OUTPATIENT)
Dept: HOME HEALTH SERVICES | Facility: HOME HEALTH | Age: 83
End: 2018-05-01

## 2018-05-01 ENCOUNTER — PATIENT OUTREACH (OUTPATIENT)
Dept: CARDIOLOGY CLINIC | Age: 83
End: 2018-05-01

## 2018-05-01 VITALS
OXYGEN SATURATION: 96 % | HEART RATE: 87 BPM | SYSTOLIC BLOOD PRESSURE: 126 MMHG | RESPIRATION RATE: 18 BRPM | TEMPERATURE: 97.2 F | DIASTOLIC BLOOD PRESSURE: 60 MMHG

## 2018-05-01 PROCEDURE — 3331090002 HH PPS REVENUE DEBIT

## 2018-05-01 PROCEDURE — G0299 HHS/HOSPICE OF RN EA 15 MIN: HCPCS

## 2018-05-01 PROCEDURE — 3331090001 HH PPS REVENUE CREDIT

## 2018-05-01 PROCEDURE — 400013 HH SOC

## 2018-05-01 NOTE — PROGRESS NOTES
Care Management Interventions  PCP Verified by CM: Yes (DR. Lady Burleson)  Palliative Care Criteria Met (RRAT>21 & CHF Dx)?: No  Mode of Transport at Discharge:  Other (see comment) (FAMILY)  Transition of Care Consult (CM Consult): Discharge Planning, [address]: Yes  Confirm Follow Up Transport: Family  Plan discussed with Pt/Family/Caregiver: Yes ([facility]  [place] of Choice Offered: Yes  [address] Provided?: No  Discharge Location  Discharge Placement: Home with home health

## 2018-05-01 NOTE — PROGRESS NOTES
Heart Failure Follow Up Call    NN contacted the patient by telephone to perform CHF Follow Up. Verified  and address as identifiers. Anthony Scott reports feeling fine. Daily Weight (document daily weights in flowsheets):   Patient will begin weighing daily starting 18   Amount:  200 lbs last hospital weight      Provider Notified:   No     Zone:(Pt Reported)  green     Goals      Maintains daily weight. 18 Patient will verbalize understanding of daily weights by 18       Patient verbalizes understanding of self-management goals of living with Congestive Heart Failure            18 Patient will verbalize understanding of heart failure zones and when to call physician/NN for red flags by 18       Understands and adheres to diet. 18 Patient will demonstrate understanding of low sodium diet and read food labels to monitor sodium intake by 6/15/18            Needs addressed from pathway:    24-48 Hours- or initial contact   Review/Verification:  ? Identify care team  ? Disposition (Home, SNF, IRF LTC, [facility], Hospice)  ? DC Instructions, Education, and HF Zones  ? Eric Curtis in place. [facility], Dialysis center)   ? Evaluate DME needs; arrange home equipment  ? Advanced care planning (e.g.: Palliative Care; Hospice  ? Type of monitoring  ? Labs/Diagnostics to follow  ? Follow-up apts are arranged  ? Identify transportation    Med Rec*   ? Ace/Arb,   ? Diuretic,   ? Beta Blocker,   ? Potassium,   Baseline Labs*  ? BMP  ? BUN/Creat.  ? PT/INR  ? Hgb/Hct  ? WBC    FLAVIA Documentation:  ? Goals  ? Challenges/Plan  ? Weight    ? Edema  ? Symptoms    Education Disease Management:  ? Cardiac Low-sodium Diet (No added sodium; 1500mg as indicated). If Diabetic:   ?  include carb-controlled   ? Fluid restriction (if indicated)  ? Comorbidity Management  ? Daily Weights  ?  Advance medical directive status          PCP: Lisa Restrepo  Cardiologist: Mackenzie Fatima  Specialist:   (1-3 days): yes  Follow up appointment with PCP (within 2-3 days): 5/9/18  Follow up appointment with Cardiology (2-3 weeks) 5/2/18 @ 9:30    Cardiologist consult while IP: yes     Palliative consult while IP:    No     EF: 55%   Type of HF:   HFpEF     Cardiac Device present: None      Heart Failure Medications: ACE/ARB, Betablocker, Diuretic, Potassium     Disposition of patient:  Home, Home Health     [facility] Services/Tele Monitoring:  Home Health/[facility]     Social support: family    Do you have a Scale:    yes   How often do you weigh:  will begin weighing daily  Does patient have an Advance Directive:  not on file     Advance Directive scanned into patients chart:  No     Patient reminded that there are physicians on call 24 hours a day / 7 days a week (M-F 5pm to 8am and from Friday 5pm until Monday 8a for the weekend) should the patient have questions or concerns. Patient reminded to call 911 if situation is emergent or patient feels the situation is emergent. Pt verbalizes understanding.

## 2018-05-02 PROCEDURE — 3331090002 HH PPS REVENUE DEBIT

## 2018-05-02 PROCEDURE — 3331090001 HH PPS REVENUE CREDIT

## 2018-05-03 ENCOUNTER — HOME CARE VISIT (OUTPATIENT)
Dept: SCHEDULING | Facility: HOME HEALTH | Age: 83
End: 2018-05-03
Payer: MEDICARE

## 2018-05-03 ENCOUNTER — HOME CARE VISIT (OUTPATIENT)
Dept: HOME HEALTH SERVICES | Facility: HOME HEALTH | Age: 83
End: 2018-05-03
Payer: MEDICARE

## 2018-05-03 ENCOUNTER — PATIENT OUTREACH (OUTPATIENT)
Dept: CARDIOLOGY CLINIC | Age: 83
End: 2018-05-03

## 2018-05-03 PROCEDURE — 3331090001 HH PPS REVENUE CREDIT

## 2018-05-03 PROCEDURE — G0300 HHS/HOSPICE OF LPN EA 15 MIN: HCPCS

## 2018-05-03 PROCEDURE — 3331090002 HH PPS REVENUE DEBIT

## 2018-05-03 RX ORDER — POTASSIUM CHLORIDE 750 MG/1
10 TABLET, EXTENDED RELEASE ORAL DAILY
Qty: 90 TAB | Refills: 1 | Status: SHIPPED | OUTPATIENT
Start: 2018-05-03

## 2018-05-03 RX ORDER — AMLODIPINE BESYLATE 10 MG/1
10 TABLET ORAL DAILY
Qty: 90 TAB | Refills: 1 | Status: SHIPPED | OUTPATIENT
Start: 2018-05-03 | End: 2018-10-30 | Stop reason: SDUPTHER

## 2018-05-03 NOTE — PROGRESS NOTES
NN contacted the patient by telephone to perform CHF follow Up. Noted Priorities/Plan: To continue to weigh daily and go to follow up appointments. Daily Weight: 200 (stable)  Zone: green   Signs/Symptoms: Edema none at this time; SOB none at this time. Goals      Maintains daily weight. 5/1/18 Patient will verbalize understanding of daily weights by 5/30/18       Patient verbalizes understanding of self-management goals of living with Congestive Heart Failure            5/1/18 Patient will verbalize understanding of heart failure zones and when to call physician/NN for red flags by 5/30/18       Understands and adheres to diet. 5/1/18 Patient will demonstrate understanding of low sodium diet and read food labels to monitor sodium intake by 6/15/18            Needs addressed from pathway:   Week 1-4   Provide Daily Disease Management  (NN initiated)  ? Daily weight (Before Breakfast-  Daily Zone Identification (symptom management; weight, edema, SOB, activity/sleep changes)-notify provider immediately as indicated  ? Cardiac Low-sodium Diet (No added sodium; 1500mg as indicated). If  ? Fluid restriction (if indicated)  ? Comorbidity Management  ? Confirm follow-up appointments/transportation. Reschedule if needed. Additional assessment   ? Fall precautions    ? Activity tolerance assessment   (eg: Vital signs; level of consciousness; dyspnea on exertion; pillow usage; recliner vs bed)   ? Energy conservation management (balance activity with rest)  ? Labs/diagnostics *as indicated  ? Medication Therapy *as directed  ? Diet/appetite assessment  ? Home assessment/modifications * as indicated   ? Transportation  assistance  ? Medication assistance  ? Home health services  ? Personal assistance  ? SNF utilization  Psychosocial: Reassurance/emotional support   Monitoring:  ? Home health  ? [facility]  ? [facility]  ? Tele-monitoring  ? Cardiac device monitoring  ?  My Chart  Education:  ? Support system identification ( eg: Caregiver, meal planning, community resources, family, friends, Worship, support group)   ? Health literacy for heart failure  ? Caregiver education and preparation           Have you been to an ER/Hospital since discharge from [facility]? No      Have you followed up with PCP/Cardiologist/Specialist? Unable to attend original follow up appointment with Dr. Aleah Lang, pt rescheduled for 5/9/18, daughter is providing transportation. Transportation: daughter  Diet: cardiac/low sodium  Activity/ADLs: able to perform ADL's. Medications Reconciled at this time:  yes  Home health:  Company/Completion: [employer] [facility]  Social Support: family    Advanced Care Plan: (if not addressed)    Ms Li Needs states she was waiting to see the doctor to get refills on her Norvasc and Potassium Chloride. NN called cardiologist's office and was able to have prescription refilled with her [facility], patient notified. Home health has been to see patient and is scheduled to see her today. Patient reminded that there is a physician on call 24 hours a day / 7 days a week (M-F 5pm to 8am and from Friday 5pm until Monday 8a for the weekend) should the patient have questions or concerns. Patient reminded to call 911 if situation is emergent or patient feels the situation is emergent. Pt verbalizes understanding.

## 2018-05-04 ENCOUNTER — HOME CARE VISIT (OUTPATIENT)
Dept: SCHEDULING | Facility: HOME HEALTH | Age: 83
End: 2018-05-04
Payer: MEDICARE

## 2018-05-04 ENCOUNTER — TELEPHONE (OUTPATIENT)
Dept: CARDIAC REHAB | Age: 83
End: 2018-05-04

## 2018-05-04 VITALS
HEART RATE: 71 BPM | RESPIRATION RATE: 17 BRPM | SYSTOLIC BLOOD PRESSURE: 110 MMHG | TEMPERATURE: 97.6 F | DIASTOLIC BLOOD PRESSURE: 80 MMHG

## 2018-05-04 PROCEDURE — G0151 HHCP-SERV OF PT,EA 15 MIN: HCPCS

## 2018-05-04 PROCEDURE — 3331090002 HH PPS REVENUE DEBIT

## 2018-05-04 PROCEDURE — 3331090001 HH PPS REVENUE CREDIT

## 2018-05-05 PROCEDURE — 3331090002 HH PPS REVENUE DEBIT

## 2018-05-05 PROCEDURE — 3331090001 HH PPS REVENUE CREDIT

## 2018-05-06 ENCOUNTER — HOME CARE VISIT (OUTPATIENT)
Dept: HOME HEALTH SERVICES | Facility: HOME HEALTH | Age: 83
End: 2018-05-06
Payer: MEDICARE

## 2018-05-06 VITALS
TEMPERATURE: 98.2 F | SYSTOLIC BLOOD PRESSURE: 134 MMHG | DIASTOLIC BLOOD PRESSURE: 83 MMHG | HEART RATE: 71 BPM | RESPIRATION RATE: 18 BRPM | OXYGEN SATURATION: 96 %

## 2018-05-06 PROCEDURE — 3331090001 HH PPS REVENUE CREDIT

## 2018-05-06 PROCEDURE — 3331090002 HH PPS REVENUE DEBIT

## 2018-05-07 ENCOUNTER — HOME CARE VISIT (OUTPATIENT)
Dept: HOME HEALTH SERVICES | Facility: HOME HEALTH | Age: 83
End: 2018-05-07
Payer: MEDICARE

## 2018-05-07 PROCEDURE — 3331090001 HH PPS REVENUE CREDIT

## 2018-05-07 PROCEDURE — 3331090002 HH PPS REVENUE DEBIT

## 2018-05-08 ENCOUNTER — PATIENT OUTREACH (OUTPATIENT)
Dept: CARDIOLOGY CLINIC | Age: 83
End: 2018-05-08

## 2018-05-08 ENCOUNTER — HOME CARE VISIT (OUTPATIENT)
Dept: SCHEDULING | Facility: HOME HEALTH | Age: 83
End: 2018-05-08
Payer: MEDICARE

## 2018-05-08 VITALS
TEMPERATURE: 97.4 F | SYSTOLIC BLOOD PRESSURE: 116 MMHG | HEART RATE: 84 BPM | DIASTOLIC BLOOD PRESSURE: 68 MMHG | OXYGEN SATURATION: 99 %

## 2018-05-08 PROCEDURE — 3331090002 HH PPS REVENUE DEBIT

## 2018-05-08 PROCEDURE — 3331090001 HH PPS REVENUE CREDIT

## 2018-05-08 PROCEDURE — G0157 HHC PT ASSISTANT EA 15: HCPCS

## 2018-05-09 ENCOUNTER — OFFICE VISIT (OUTPATIENT)
Dept: CARDIOLOGY CLINIC | Age: 83
End: 2018-05-09

## 2018-05-09 VITALS
HEIGHT: 65 IN | WEIGHT: 205 LBS | BODY MASS INDEX: 34.16 KG/M2 | DIASTOLIC BLOOD PRESSURE: 67 MMHG | HEART RATE: 61 BPM | SYSTOLIC BLOOD PRESSURE: 137 MMHG

## 2018-05-09 DIAGNOSIS — I21.4 NON-STEMI (NON-ST ELEVATED MYOCARDIAL INFARCTION) (HCC): ICD-10-CM

## 2018-05-09 DIAGNOSIS — E78.5 HYPERLIPIDEMIA, UNSPECIFIED HYPERLIPIDEMIA TYPE: ICD-10-CM

## 2018-05-09 DIAGNOSIS — I50.31 ACUTE DIASTOLIC CHF (CONGESTIVE HEART FAILURE) (HCC): Primary | ICD-10-CM

## 2018-05-09 DIAGNOSIS — I10 ESSENTIAL HYPERTENSION: ICD-10-CM

## 2018-05-09 PROCEDURE — 3331090002 HH PPS REVENUE DEBIT

## 2018-05-09 PROCEDURE — 3331090001 HH PPS REVENUE CREDIT

## 2018-05-09 NOTE — PROGRESS NOTES
HISTORY OF PRESENT ILLNESS  Klaudia Hair is a 80 y.o. female. HPI Comments: Recent admission with chf-non stemi  Feels better after discharge    Hospital Follow Up   The history is provided by the patient. This is a new problem. Associated symptoms include shortness of breath. Pertinent negatives include no chest pain. Chest Pain (Angina)    The history is provided by the patient. This is a chronic problem. The current episode started more than 1 week ago. The problem has not changed since onset. The problem occurs every several days. The pain is associated with exertion and normal activity. The pain is present in the left side. The pain is at a severity of 3/10. The pain is mild. The quality of the pain is described as sharp. The pain radiates to the left jaw. Associated symptoms include shortness of breath. Pertinent negatives include no claudication, no cough, no diaphoresis, no dizziness, no fever, no hemoptysis, no malaise/fatigue, no nausea, no orthopnea, no palpitations, no PND, no sputum production, no vomiting and no weakness. Risk factors include hypertension, obesity and dyslipidemia. Her past medical history is significant for HTN. Pertinent negatives include no cardiac catheterization, no echocardiogram and no stress thallium. CHF   The history is provided by the patient. This is a new problem. The current episode started more than 1 week ago. The problem occurs constantly. The problem has been gradually improving. Associated symptoms include shortness of breath. Pertinent negatives include no chest pain. The symptoms are aggravated by exertion. The symptoms are relieved by rest.       Review of Systems   Constitutional: Negative for chills, diaphoresis, fever, malaise/fatigue and weight loss. HENT: Negative for congestion, ear discharge, ear pain, hearing loss, nosebleeds and tinnitus. Eyes: Negative for blurred vision. Respiratory: Positive for shortness of breath.  Negative for cough, hemoptysis, sputum production, wheezing and stridor. Cardiovascular: Negative for chest pain, palpitations, orthopnea, claudication, leg swelling and PND. Gastrointestinal: Negative for heartburn, nausea and vomiting. Musculoskeletal: Negative for myalgias and neck pain. Skin: Negative for itching and rash. Neurological: Negative for dizziness, tingling, tremors, focal weakness, loss of consciousness and weakness. Psychiatric/Behavioral: Negative for depression and suicidal ideas. No family history on file. Past Medical History:   Diagnosis Date    Arthritis     Hypercholesteremia     Hypertension     Nerve pain        Past Surgical History:   Procedure Laterality Date    HX CATARACT REMOVAL  2007 & 2008    bilateral    HX HYSTERECTOMY  1977    HX KNEE REPLACEMENT Right 2013    Dr. Nash Watts    \"slipped disc surgery\"       Social History   Substance Use Topics    Smoking status: Never Smoker    Smokeless tobacco: Never Used    Alcohol use Yes      Comment: glass of wine very seldom       Allergies   Allergen Reactions    Other Medication Itching and Swelling     Apples, pears, strawberries, cherries, any type of berries,avacado,pears,peaches,kiwi. Patient states cherries make throat swell and the others make her throat itch. Outpatient Prescriptions Marked as Taking for the 5/9/18 encounter (Office Visit) with Melissa Zaidi MD   Medication Sig Dispense Refill    amLODIPine (NORVASC) 10 mg tablet Take 1 Tab by mouth daily. Indications: hypertension 90 Tab 01    potassium chloride (KLOR-CON) 10 mEq tablet Take 1 Tab by mouth daily. 90 Tab 01    aspirin 81 mg chewable tablet Take 1 Tab by mouth daily. 30 Tab 0    oxyCODONE IR (ROXICODONE) 5 mg immediate release tablet Take 1 tablet by mouth every four (4) hours as needed (For pain rating 4-7). 60 tablet 0    carvedilol (COREG) 6.25 mg tablet Take 6.25 mg by mouth two (2) times daily (with meals).  torsemide (DEMADEX) 20 mg tablet Take 20 mg by mouth daily.  Calcium Carbonate-Vit D3-Min (CALTRATE 600+D PLUS MINERALS) 600 mg calcium- 400 unit Tab Take 1 Tab by mouth two (2) times a day.  ipratropium-albuterol (COMBIVENT)  mcg/actuation inhaler Take 2 Puffs by inhalation every six (6) hours as needed for Wheezing or Shortness of Breath.  gabapentin (NEURONTIN) 300 mg capsule Take 300 mg by mouth two (2) times a day.  lisinopril (PRINIVIL, ZESTRIL) 20 mg tablet Take 20 mg by mouth daily. Indications: HYPERTENSION      atorvastatin (LIPITOR) 40 mg tablet Take 40 mg by mouth daily. Indications: HYPERCHOLESTEROLEMIA          Visit Vitals    /67    Pulse 61    Ht 5' 5\" (1.651 m)    Wt 93 kg (205 lb)    BMI 34.11 kg/m2     Physical Exam   Constitutional: She is oriented to person, place, and time. She appears well-developed and well-nourished. No distress. HENT:   Head: Atraumatic. Mouth/Throat: No oropharyngeal exudate. Eyes: Conjunctivae are normal. Right eye exhibits no discharge. Left eye exhibits no discharge. No scleral icterus. Neck: Normal range of motion. Neck supple. No JVD present. No tracheal deviation present. No thyromegaly present. Cardiovascular: Normal rate and regular rhythm. Exam reveals no gallop. No murmur heard. Pulmonary/Chest: Effort normal and breath sounds normal. No stridor. She has no wheezes. She has no rales. Abdominal: Soft. There is no tenderness. There is no rebound and no guarding. Musculoskeletal: Normal range of motion. She exhibits no edema or tenderness. Lymphadenopathy:     She has no cervical adenopathy. Neurological: She is alert and oriented to person, place, and time. She exhibits normal muscle tone. Skin: Skin is warm. She is not diaphoretic. Psychiatric: She has a normal mood and affect.  Her behavior is normal.     ekg sinus rhythm with no acute st-t changes  I have personally reviewed patient's records available from hospital and other providers and incorporated findings in patient care. [facility]  ASSESSMENT and PLAN    ICD-10-CM ICD-9-CM    1. Acute diastolic CHF (congestive heart failure) (HCC) I50.31 428.31      428.0     improving after discsarge  continue treatment   2. Non-STEMI (non-ST elevated myocardial infarction) ([facility] [address] I21.4 410.70     cath no significant cad reposted  medical managment   3. Essential hypertension I10 401.9     controlled   4. Hyperlipidemia, unspecified hyperlipidemia type E78.5 272.4     continue statin     No orders of the defined types were placed in this encounter. Follow-up Disposition:  Return in about 4 weeks (around 6/6/2018) for follow up with Dr Radames Bradshaw.

## 2018-05-09 NOTE — MR AVS SNAPSHOT
[address] [facility] 
 
 
 [address], [address] [facility] [address] [phone] Patient: Beth Rowan MRN: [id number] Visit Information Date & Time Provider Department Dept. Phone Encounter #  
 5/9/2018 11:00 AM Lm Storm MD [facility] [address] [place] [address] [id number] Follow-up Instructions Return in about 4 weeks (around 6/6/2018) for follow up with Dr Malgorzata Dotson. Your Appointments 5/9/2018 11:00 AM  
Office Visit with Lm Storm MD  
[facility]) Appt Note: h/f; â¢h/f needs lab order x 2 weeks BMP  
 [address], [address] [facility] [address]  
[address], [address] [facility] [address]  
  
    
 6/5/2018 11:00 AM  
Office Visit with Maura Ortez MD  
[facility]) Appt Note: 4 weeks post labs [address], [address] [facility] [address]  
[address], [address] [facility] [address] [facility] Upcoming Health Maintenance Date Due DTaP/Tdap/Td series (1 - Tdap) 4/18/1956 ZOSTER VACCINE AGE 60> 2/18/1995 GLAUCOMA SCREENING Q2Y 4/18/2000 Bone Densitometry (Dexa) Screening 4/18/2000 Pneumococcal 65+ High/Highest Risk (1 of 2 - PCV13) 4/18/2000 MEDICARE YEARLY EXAM 3/14/2018 Influenza Age 5 to Adult 8/1/2018 Allergies as of 5/9/2018  Review Complete On: 5/9/2018 By: Lm Storm MD  
  
 Severity Noted Reaction Type Reactions Other Medication  09/17/2013    Itching, Swelling Apples, pears, strawberries, cherries, any type of berries,avacado,pears,peaches,kiwi. Patient states cherries make throat swell and the others make her throat itch. Current Immunizations  Never Reviewed No immunizations on file. Not reviewed this visit You Were Diagnosed With   
  
 Codes Comments Acute diastolic CHF (congestive heart failure) (HCC)    -  Primary ICD-10-CM: I50.31 ICD-9-CM: 428.31, 428.0 improving after discsarge 
continue treatment Non-STEMI (non-ST elevated myocardial infarction) ([facility] [address]     ICD-10-CM: I21.4 ICD-9-CM: 410.70 cath no significant cad reposted 
medical managment Essential hypertension     ICD-10-CM: I10 
ICD-9-CM: 401.9 controlled Hyperlipidemia, unspecified hyperlipidemia type     ICD-10-CM: E78.5 ICD-9-CM: 272.4 continue statin Vitals BP Pulse Height(growth percentile) Weight(growth percentile) BMI OB Status 137/67 61 5' 5\" (1.651 m) 205 lb (93 kg) 34.11 kg/m2 Hysterectomy Smoking Status Never Smoker Vitals History BMI and BSA Data Body Mass Index Body Surface Area  
 34.11 kg/m 2 2.07 m 2 Preferred Pharmacy Pharmacy Name Phone [facility], [facility] [phone] Your Updated Medication List  
  
   
This list is accurate as of 5/9/18 10:54 AM.  Always use your most recent med list. amLODIPine 10 mg tablet Commonly known as:  Lizet Bower Take 1 Tab by mouth daily. Indications: hypertension  
  
 aspirin 81 mg chewable tablet Take 1 Tab by mouth daily. azelastine 0.05 % ophthalmic solution Commonly known as:  OPTIVAR Administer 1 Drop to both eyes two (2) times a day. Use in affected eye(s) CALTRATE 600+D PLUS MINERALS 600 mg calcium- 400 unit Tab Generic drug:  Calcium Carbonate-Vit D3-Min Take 1 Tab by mouth two (2) times a day. COMBIVENT  mcg/actuation inhaler Generic drug:  ipratropium-albuterol Take 2 Puffs by inhalation every six (6) hours as needed for Wheezing or Shortness of Breath. COREG 6.25 mg tablet Generic drug:  carvedilol Take 6.25 mg by mouth two (2) times daily (with meals). gabapentin 300 mg capsule Commonly known as:  NEURONTIN  
 Take 300 mg by mouth two (2) times a day. LIPITOR 40 mg tablet Generic drug:  atorvastatin Take 40 mg by mouth daily. Indications: HYPERCHOLESTEROLEMIA  
  
 lisinopril 20 mg tablet Commonly known as:  James Elisa Take 20 mg by mouth daily. Indications: HYPERTENSION  
  
 oxyCODONE IR 5 mg immediate release tablet Commonly known as:  Micaela Centeno Take 1 tablet by mouth every four (4) hours as needed (For pain rating 4-7). potassium chloride 10 mEq tablet Commonly known as:  KLOR-CON Take 1 Tab by mouth daily. torsemide 20 mg tablet Commonly known as:  DEMADEX Take 20 mg by mouth daily. Follow-up Instructions Return in about 4 weeks (around 6/6/2018) for follow up with Dr Elizabeth Staton. To-Do List   
 05/09/2018 Lab:  METABOLIC PANEL, BASIC   
  
 05/10/2018 To Be Determined Appointment with Etienne Pitts PTA at [address] [place] [address] [facility] MED [facility]  
  
 05/14/2018 To Be Determined Appointment with Herrera Elliott LPN at [address] [place] [address] [facility]  
  
 05/15/2018 To Be Determined Appointment with Etienne Pitts PTA at [address] [place] [address] [facility]  
  
 05/17/2018 To Be Determined Appointment with Etienne Pitts PTA at [address] [place] [address] [facility]  
  
 05/22/2018 To Be Determined Appointment with Etienne Pitts PTA at [address] [place] [address] [facility]  
  
 05/24/2018 To Be Determined Appointment with Landon Campa PT at [address] [place] [address] SERVICES! Mary Bauer introduces [employer] patient portal. Now you can access parts of your medical record, email your doctor's office, and request medication refills online. 1. In your internet browser, go to [email or web address] 2. Click on the First Time User? Click Here link in the Sign In box. You will see the New Member Sign Up page. 3. Enter your i[employer] Access Code exactly as it appears below. You will not need to use this code after youve completed the sign-up process. If you do not sign up before the expiration date, you must request a new code. · [employer] Access Code: [email or web address] Expires: 7/27/2018  5:27 PM 
 
4. Enter the last four digits of your Social Security Number (xxxx) and Date of Birth (mm/dd/yyyy) as indicated and click Submit. You will be taken to the next sign-up page. 5. Create a i[employer] ID. This will be your i[employer] login ID and cannot be changed, so think of one that is secure and easy to remember. 6. Create a i[employer] password. You can change your password at any time. 7. Enter your Password Reset Question and Answer. This can be used at a later time if you forget your password. 8. Enter your e-mail address. You will receive e-mail notification when new information is available in [address] 9. Click Sign Up. You can now view and download portions of your medical record. 10. Click the Download Summary menu link to download a portable copy of your medical information. If you have questions, please visit the Frequently Asked Questions section of the [employer] website. Remember, [employer] is NOT to be used for urgent needs. For medical emergencies, dial 911. Now available from your iPhone and Android! Please provide this summary of care documentation to your next provider. Your primary care clinician is listed as [address]. If you have any questions after today's visit, please call [phone].

## 2018-05-09 NOTE — LETTER
[place] 1935 5/9/2018 Dear Charlene Jane MD 
 
I had the pleasure of evaluating  Ms. Montiel in office today. Below are the relevant portions of my assessment and plan of care. ICD-10-CM ICD-9-CM 1. Acute diastolic CHF (congestive heart failure) (HCC) I50.31 428.31   
  428.0   
 improving after discsarge 
continue treatment 2. Non-STEMI (non-ST elevated myocardial infarction) ([facility] [address] I21.4 410.70   
 cath no significant cad reposted 
medical managment 3. Essential hypertension N41 598.7 METABOLIC PANEL, BASIC  
 controlled 4. Hyperlipidemia, unspecified hyperlipidemia type E78.5 272.4   
 continue statin Current Outpatient Prescriptions Medication Sig Dispense Refill  amLODIPine (NORVASC) 10 mg tablet Take 1 Tab by mouth daily. Indications: hypertension 90 Tab 01  
 potassium chloride (KLOR-CON) 10 mEq tablet Take 1 Tab by mouth daily. 90 Tab 01  
 aspirin 81 mg chewable tablet Take 1 Tab by mouth daily. 30 Tab 0  
 oxyCODONE IR (ROXICODONE) 5 mg immediate release tablet Take 1 tablet by mouth every four (4) hours as needed (For pain rating 4-7). 60 tablet 0  
 carvedilol (COREG) 6.25 mg tablet Take 6.25 mg by mouth two (2) times daily (with meals).  torsemide (DEMADEX) 20 mg tablet Take 20 mg by mouth daily.  Calcium Carbonate-Vit D3-Min (CALTRATE 600+D PLUS MINERALS) 600 mg calcium- 400 unit Tab Take 1 Tab by mouth two (2) times a day.  ipratropium-albuterol (COMBIVENT)  mcg/actuation inhaler Take 2 Puffs by inhalation every six (6) hours as needed for Wheezing or Shortness of Breath.  gabapentin (NEURONTIN) 300 mg capsule Take 300 mg by mouth two (2) times a day.  lisinopril (PRINIVIL, ZESTRIL) 20 mg tablet Take 20 mg by mouth daily. Indications: HYPERTENSION    
 atorvastatin (LIPITOR) 40 mg tablet Take 40 mg by mouth daily. Indications: HYPERCHOLESTEROLEMIA  azelastine (OPTIVAR) 0.05 % ophthalmic solution Administer 1 Drop to both eyes two (2) times a day. Use in affected eye(s) Orders Placed This Encounter  METABOLIC PANEL, BASIC Standing Status:   Future Standing Expiration Date:   5/10/2019 If you have questions, please do not hesitate to call me. I look forward to following Ms. Montiel along with you. Sincerely, Brady Veliz MD

## 2018-05-10 ENCOUNTER — HOME CARE VISIT (OUTPATIENT)
Dept: SCHEDULING | Facility: HOME HEALTH | Age: 83
End: 2018-05-10
Payer: MEDICARE

## 2018-05-10 ENCOUNTER — HOSPITAL ENCOUNTER (OUTPATIENT)
Dept: LAB | Age: 83
Discharge: HOME | End: 2018-05-10
Payer: MEDICARE

## 2018-05-10 ENCOUNTER — HOME CARE VISIT (OUTPATIENT)
Dept: HOME HEALTH SERVICES | Facility: HOME HEALTH | Age: 83
End: 2018-05-10
Payer: MEDICARE

## 2018-05-10 VITALS
SYSTOLIC BLOOD PRESSURE: 120 MMHG | HEART RATE: 83 BPM | TEMPERATURE: 97.4 F | OXYGEN SATURATION: 98 % | DIASTOLIC BLOOD PRESSURE: 68 MMHG

## 2018-05-10 LAB
ALBUMIN SERPL-MCNC: 3.7 G/DL (ref 3.4–5)
ALBUMIN/GLOB SERPL: 1.1 {RATIO} (ref 0.8–1.7)
ALP SERPL-CCNC: 78 U/L (ref 45–117)
ALT SERPL-CCNC: 24 U/L (ref 13–56)
ANION GAP SERPL CALC-SCNC: 4 MMOL/L (ref 3–18)
AST SERPL-CCNC: 22 U/L (ref 15–37)
BASOPHILS # BLD: 0 K/UL (ref 0–0.1)
BASOPHILS NFR BLD: 0 % (ref 0–2)
BILIRUB SERPL-MCNC: 0.5 MG/DL (ref 0.2–1)
BUN SERPL-MCNC: 19 MG/DL (ref 7–18)
BUN/CREAT SERPL: 18 (ref 12–20)
CALCIUM SERPL-MCNC: 8.8 MG/DL (ref 8.5–10.1)
CHLORIDE SERPL-SCNC: 107 MMOL/L (ref 100–108)
CO2 SERPL-SCNC: 32 MMOL/L (ref 21–32)
CREAT SERPL-MCNC: 1.04 MG/DL (ref 0.6–1.3)
DIFFERENTIAL METHOD BLD: ABNORMAL
EOSINOPHIL # BLD: 0.3 K/UL (ref 0–0.4)
EOSINOPHIL NFR BLD: 4 % (ref 0–5)
ERYTHROCYTE [DISTWIDTH] IN BLOOD BY AUTOMATED COUNT: 13.9 % (ref 11.6–14.5)
GLOBULIN SER CALC-MCNC: 3.4 G/DL (ref 2–4)
GLUCOSE SERPL-MCNC: 75 MG/DL (ref 74–99)
HCT VFR BLD AUTO: 35.7 % (ref 35–45)
HGB BLD-MCNC: 11.5 G/DL (ref 12–16)
LYMPHOCYTES # BLD: 2.3 K/UL (ref 0.9–3.6)
LYMPHOCYTES NFR BLD: 31 % (ref 21–52)
MCH RBC QN AUTO: 29.3 PG (ref 24–34)
MCHC RBC AUTO-ENTMCNC: 32.2 G/DL (ref 31–37)
MCV RBC AUTO: 90.8 FL (ref 74–97)
MONOCYTES # BLD: 0.5 K/UL (ref 0.05–1.2)
MONOCYTES NFR BLD: 7 % (ref 3–10)
NEUTS SEG # BLD: 4.1 K/UL (ref 1.8–8)
NEUTS SEG NFR BLD: 58 % (ref 40–73)
PLATELET # BLD AUTO: 221 K/UL (ref 135–420)
PMV BLD AUTO: 11 FL (ref 9.2–11.8)
POTASSIUM SERPL-SCNC: 4.2 MMOL/L (ref 3.5–5.5)
PROT SERPL-MCNC: 7.1 G/DL (ref 6.4–8.2)
RBC # BLD AUTO: 3.93 M/UL (ref 4.2–5.3)
SODIUM SERPL-SCNC: 143 MMOL/L (ref 136–145)
WBC # BLD AUTO: 7.2 K/UL (ref 4.6–13.2)

## 2018-05-10 PROCEDURE — 3331090002 HH PPS REVENUE DEBIT

## 2018-05-10 PROCEDURE — 80053 COMPREHEN METABOLIC PANEL: CPT | Performed by: INTERNAL MEDICINE

## 2018-05-10 PROCEDURE — 85025 COMPLETE CBC W/AUTO DIFF WBC: CPT | Performed by: INTERNAL MEDICINE

## 2018-05-10 PROCEDURE — 36415 COLL VENOUS BLD VENIPUNCTURE: CPT | Performed by: INTERNAL MEDICINE

## 2018-05-10 PROCEDURE — G0157 HHC PT ASSISTANT EA 15: HCPCS

## 2018-05-10 PROCEDURE — 3331090001 HH PPS REVENUE CREDIT

## 2018-05-11 PROCEDURE — 3331090001 HH PPS REVENUE CREDIT

## 2018-05-11 PROCEDURE — 3331090002 HH PPS REVENUE DEBIT

## 2018-05-12 PROCEDURE — 3331090002 HH PPS REVENUE DEBIT

## 2018-05-12 PROCEDURE — 3331090001 HH PPS REVENUE CREDIT

## 2018-05-13 PROCEDURE — 3331090002 HH PPS REVENUE DEBIT

## 2018-05-13 PROCEDURE — 3331090001 HH PPS REVENUE CREDIT

## 2018-05-14 ENCOUNTER — PATIENT OUTREACH (OUTPATIENT)
Dept: CARDIOLOGY CLINIC | Age: 83
End: 2018-05-14

## 2018-05-14 ENCOUNTER — HOME CARE VISIT (OUTPATIENT)
Dept: SCHEDULING | Facility: HOME HEALTH | Age: 83
End: 2018-05-14
Payer: MEDICARE

## 2018-05-14 VITALS
SYSTOLIC BLOOD PRESSURE: 118 MMHG | DIASTOLIC BLOOD PRESSURE: 64 MMHG | OXYGEN SATURATION: 96 % | HEART RATE: 86 BPM | TEMPERATURE: 98.6 F

## 2018-05-14 PROCEDURE — G0157 HHC PT ASSISTANT EA 15: HCPCS

## 2018-05-14 PROCEDURE — 3331090001 HH PPS REVENUE CREDIT

## 2018-05-14 PROCEDURE — G0152 HHCP-SERV OF OT,EA 15 MIN: HCPCS

## 2018-05-14 PROCEDURE — 3331090002 HH PPS REVENUE DEBIT

## 2018-05-14 NOTE — PROGRESS NOTES
NN contacted the patient by telephone to perform CHF follow Up. Noted Priorities/Plan: To get battery for scale to weigh daily. Daily Weight: has not weighed  Zone: green   Signs/Symptoms: Edema none; SOB none at this time; orthopnea patient reports has pillow with side arms, approximately 2-3 pillows. Goals      Maintains daily weight. 5/1/18 Patient will verbalize understanding of daily weights by 5/30/18 5/14/18 Patient has been unable to weigh herself, her scale needed a new battery. Daughter is to replace battery today.  Patient verbalizes understanding of self-management goals of living with Congestive Heart Failure            5/1/18 Patient will verbalize understanding of heart failure zones and when to call physician/NN for red flags by 5/30/18       Understands and adheres to diet. 5/1/18 Patient will demonstrate understanding of low sodium diet and read food labels to monitor sodium intake by 6/15/18            Needs addressed from pathway:   Week 1-4   Provide Daily Disease Management  (NN initiated)  ? Daily weight (Before Breakfast-  Daily Zone Identification (symptom management; weight, edema, SOB, activity/sleep changes)-notify provider immediately as indicated  ? Cardiac Low-sodium Diet (No added sodium; 1500mg as indicated). If  Diabetic: include carbohydrate controlled   ? Fluid restriction (if indicated)  ? Comorbidity Management  ? Confirm follow-up appointments/transportation. Reschedule if needed. Additional assessment   ? Activity tolerance assessment   (eg: Vital signs; level of consciousness; dyspnea on exertion; pillow usage; recliner vs bed)   ? Energy conservation management (balance activity with rest)  ? Labs/diagnostics *as indicated  ? Medication Therapy *as directed  ? Diet/appetite assessment  ? Home assessment/modifications * as indicated   ? Transportation  assistance  ? Medication assistance  ?  Home health services  Psychosocial: Reassurance/emotional support   Monitoring:  ? Home health  ? H2H  ? [facility]  ? Tele-monitoring  ? Cardiac device monitoring  ? My Chart  Education:  ? Advanced care planning status   ? Support system identification ( eg: Caregiver, meal planning, community resources, family, friends, Denominational, support group)   ? Health literacy for heart failure  ? Caregiver education and preparation           Have you been to an ER/Hospital since discharge from [facility]? No      Have you followed up with PCP/Cardiologist/Specialist? Has attended follow up appointments    Transportation:  daughter  Diet: cardiac/low sodium  Activity/ADLs: able to perform ADL's. Medications Reconciled at this time: At Dr. Alexsandra Hubbard office on 5/9/18  Home health:  Company/Completion: [employer] [facility] [facility]  Social Support: Family    Advanced Care Plan: addressed    Patient reminded that there is a physician on call 24 hours a day / 7 days a week (M-F 5pm to 8am and from Friday 5pm until Monday 8a for the weekend) should the patient have questions or concerns. Patient reminded to call 911 if situation is emergent or patient feels the situation is emergent. Pt verbalizes understanding.

## 2018-05-14 NOTE — ACP (ADVANCE CARE PLANNING)
5/14/18 Discussion initiated about ACP and benefits. Patient stated that she would discuss with daughter. Advised that if patient and daughter would like form to look over, that her [de-identified] office would be able to provide forms. If she had any questions to call NN and phone number provided.

## 2018-05-15 ENCOUNTER — HOME CARE VISIT (OUTPATIENT)
Dept: HOME HEALTH SERVICES | Facility: HOME HEALTH | Age: 83
End: 2018-05-15
Payer: MEDICARE

## 2018-05-15 VITALS
DIASTOLIC BLOOD PRESSURE: 90 MMHG | HEART RATE: 76 BPM | SYSTOLIC BLOOD PRESSURE: 125 MMHG | OXYGEN SATURATION: 94 % | TEMPERATURE: 98.1 F

## 2018-05-15 PROCEDURE — 3331090002 HH PPS REVENUE DEBIT

## 2018-05-15 PROCEDURE — 3331090001 HH PPS REVENUE CREDIT

## 2018-05-16 ENCOUNTER — HOME CARE VISIT (OUTPATIENT)
Dept: SCHEDULING | Facility: HOME HEALTH | Age: 83
End: 2018-05-16
Payer: MEDICARE

## 2018-05-16 PROCEDURE — G0157 HHC PT ASSISTANT EA 15: HCPCS

## 2018-05-16 PROCEDURE — 3331090002 HH PPS REVENUE DEBIT

## 2018-05-16 PROCEDURE — 3331090001 HH PPS REVENUE CREDIT

## 2018-05-17 ENCOUNTER — HOME CARE VISIT (OUTPATIENT)
Dept: SCHEDULING | Facility: HOME HEALTH | Age: 83
End: 2018-05-17
Payer: MEDICARE

## 2018-05-17 VITALS
DIASTOLIC BLOOD PRESSURE: 68 MMHG | HEART RATE: 82 BPM | TEMPERATURE: 98.9 F | OXYGEN SATURATION: 95 % | SYSTOLIC BLOOD PRESSURE: 120 MMHG

## 2018-05-17 VITALS
DIASTOLIC BLOOD PRESSURE: 68 MMHG | SYSTOLIC BLOOD PRESSURE: 118 MMHG | TEMPERATURE: 98 F | RESPIRATION RATE: 20 BRPM | OXYGEN SATURATION: 96 % | HEART RATE: 71 BPM

## 2018-05-17 PROCEDURE — 3331090002 HH PPS REVENUE DEBIT

## 2018-05-17 PROCEDURE — G0299 HHS/HOSPICE OF RN EA 15 MIN: HCPCS

## 2018-05-17 PROCEDURE — 3331090001 HH PPS REVENUE CREDIT

## 2018-05-18 PROCEDURE — 3331090001 HH PPS REVENUE CREDIT

## 2018-05-18 PROCEDURE — 3331090002 HH PPS REVENUE DEBIT

## 2018-05-19 PROCEDURE — 3331090001 HH PPS REVENUE CREDIT

## 2018-05-19 PROCEDURE — 3331090002 HH PPS REVENUE DEBIT

## 2018-05-20 PROCEDURE — 3331090002 HH PPS REVENUE DEBIT

## 2018-05-20 PROCEDURE — 3331090001 HH PPS REVENUE CREDIT

## 2018-05-21 ENCOUNTER — HOME CARE VISIT (OUTPATIENT)
Dept: SCHEDULING | Facility: HOME HEALTH | Age: 83
End: 2018-05-21
Payer: MEDICARE

## 2018-05-21 VITALS
OXYGEN SATURATION: 96 % | HEART RATE: 72 BPM | DIASTOLIC BLOOD PRESSURE: 78 MMHG | TEMPERATURE: 98.3 F | SYSTOLIC BLOOD PRESSURE: 130 MMHG

## 2018-05-21 PROCEDURE — G0157 HHC PT ASSISTANT EA 15: HCPCS

## 2018-05-21 PROCEDURE — 3331090002 HH PPS REVENUE DEBIT

## 2018-05-21 PROCEDURE — 3331090001 HH PPS REVENUE CREDIT

## 2018-05-22 ENCOUNTER — PATIENT OUTREACH (OUTPATIENT)
Dept: CARDIOLOGY CLINIC | Age: 83
End: 2018-05-22

## 2018-05-22 PROCEDURE — 3331090001 HH PPS REVENUE CREDIT

## 2018-05-22 PROCEDURE — 3331090002 HH PPS REVENUE DEBIT

## 2018-05-22 NOTE — PROGRESS NOTES
NN contacted the patient by telephone to perform CHF follow Up. Noted Priorities/Plan: To purchase a new scale and to begin weighing daily. Daily Weight:  Old scale not accurate showing weight lose of 60lbs. Zone: green   Signs/Symptoms: Edema none; SOB none; orthopnea sleeps with pillow with arms approximately 2-3 pillows    Goals      Maintains daily weight. 5/1/18 Patient will verbalize understanding of daily weights by 5/30/18 5/14/18 Patient has been unable to weigh herself, her scale needed a new battery. Daughter is to replace battery today. 5/22/18 Patient did replace battery, but the scale still isn't working properly showing weight lose of 60 lbs. Patient stated that she would have daughter buy a new scale. Patient is feeling that her clothes are looser and her body feels slimmer. No signs of edema noted.  Patient verbalizes understanding of self-management goals of living with Congestive Heart Failure            5/1/18 Patient will verbalize understanding of heart failure zones and when to call physician/NN for red flags by 5/30/18       Understands and adheres to diet. 5/1/18 Patient will demonstrate understanding of low sodium diet and read food labels to monitor sodium intake by 6/15/18            Needs addressed from pathway:   Week 1-4   Provide Daily Disease Management  (NN initiated)  ? Daily weight (Before Breakfast-  Daily Zone Identification (symptom management; weight, edema, SOB, activity/sleep changes)-notify provider immediately as indicated  ? Cardiac Low-sodium Diet (No added sodium; 1500mg as indicated). If  ? Fluid restriction (if indicated)  ? Comorbidity Management  ? Confirm follow-up appointments/transportation. Reschedule if needed. Additional assessment    ? Activity tolerance assessment   (eg: Vital signs; level of consciousness; dyspnea on exertion; pillow usage; recliner vs bed)   ?  Energy conservation management (balance activity with rest)  ? Labs/diagnostics *as indicated  ? Medication Therapy *as directed  ? Diet/appetite assessment  ? Home assessment/modifications * as indicated   ? Transportation  assistance  ? Medication assistance  ? Home health services  Psychosocial: Reassurance/emotional support   Monitoring:  ? Home health  ? H2H  Education:   ? Support system identification ( eg: Caregiver, meal planning, community resources, family, friends, Mandaen, support group)   ? Health literacy for heart failure  ? Caregiver education and preparation           Have you been to an ER/Hospital since discharge from [facility]? No      Have you followed up with PCP/Cardiologist/Specialist? Has attended follow up appointments. Transportation: Daughter  Diet: cardiac/low sodium  Activity/ADLs: performs ADL's. Medications Reconciled at this time:  at Dr. Emmett Ramirez office 5/9/18  Home health:  Company/Completion: [employer] [facility] [facility] next visit by PT 5/25/18  Social Support: family      Patient reminded that there is a physician on call 24 hours a day / 7 days a week (M-F 5pm to 8am and from Friday 5pm until Monday 8a for the weekend) should the patient have questions or concerns. Patient reminded to call 911 if situation is emergent or patient feels the situation is emergent. Pt verbalizes understanding.

## 2018-05-23 PROCEDURE — 3331090002 HH PPS REVENUE DEBIT

## 2018-05-23 PROCEDURE — 3331090001 HH PPS REVENUE CREDIT

## 2018-05-24 ENCOUNTER — HOME CARE VISIT (OUTPATIENT)
Dept: SCHEDULING | Facility: HOME HEALTH | Age: 83
End: 2018-05-24
Payer: MEDICARE

## 2018-05-24 VITALS
SYSTOLIC BLOOD PRESSURE: 115 MMHG | DIASTOLIC BLOOD PRESSURE: 70 MMHG | HEART RATE: 62 BPM | TEMPERATURE: 97 F | OXYGEN SATURATION: 96 %

## 2018-05-24 PROCEDURE — 3331090001 HH PPS REVENUE CREDIT

## 2018-05-24 PROCEDURE — 3331090002 HH PPS REVENUE DEBIT

## 2018-05-24 PROCEDURE — G0151 HHCP-SERV OF PT,EA 15 MIN: HCPCS

## 2018-05-25 PROCEDURE — 3331090001 HH PPS REVENUE CREDIT

## 2018-05-25 PROCEDURE — 3331090002 HH PPS REVENUE DEBIT

## 2018-05-26 PROCEDURE — 3331090001 HH PPS REVENUE CREDIT

## 2018-05-26 PROCEDURE — 3331090002 HH PPS REVENUE DEBIT

## 2018-05-27 PROCEDURE — 3331090001 HH PPS REVENUE CREDIT

## 2018-05-27 PROCEDURE — 3331090002 HH PPS REVENUE DEBIT

## 2018-05-28 PROCEDURE — 3331090001 HH PPS REVENUE CREDIT

## 2018-05-28 PROCEDURE — 3331090002 HH PPS REVENUE DEBIT

## 2018-05-29 PROCEDURE — 3331090001 HH PPS REVENUE CREDIT

## 2018-05-29 PROCEDURE — 3331090002 HH PPS REVENUE DEBIT

## 2018-05-30 ENCOUNTER — PATIENT OUTREACH (OUTPATIENT)
Dept: CARDIOLOGY CLINIC | Age: 83
End: 2018-05-30

## 2018-05-30 NOTE — PROGRESS NOTES
NN contacted the patient by telephone to perform CHF follow Up. Noted Priorities/Plan: To buy scale this weekend, and begin weighing daily    Daily Weight: Unknown (increase or decrease)  Zone: green   Signs/Symptoms: Edema none at this time; SOB none at this time; orthopnea sleeps with elevation pillow. Goals      Maintains daily weight. 5/1/18 Patient will verbalize understanding of daily weights by 5/30/18 5/14/18 Patient has been unable to weigh herself, her scale needed a new battery. Daughter is to replace battery today. 5/22/18 Patient did replace battery, but the scale still isn't working properly showing weight lose of 60 lbs. Patient stated that she would have daughter buy a new scale. Patient is feeling that her clothes are looser and her body feels slimmer. No signs of edema noted. 5/30/18 Patient has not gotten a new scale, daughter states she will get on the weekend. She states Ms. Jocelin Mcdaniel has not experienced any edema or SOB.  Patient verbalizes understanding of self-management goals of living with Congestive Heart Failure            5/1/18 Patient will verbalize understanding of heart failure zones and when to call physician/NN for red flags by 5/30/18       Understands and adheres to diet. 5/1/18 Patient will demonstrate understanding of low sodium diet and read food labels to monitor sodium intake by 6/15/18            Needs addressed from pathway:   Week 5-8   Provide Daily Disease Management (patient/caregiver initiated)  ? Daily weight (Before Breakfast  ? Daily Zone Identification (symptom management; weight, edema, SOB, activity/sleep changes)-notify provider immediately as indicated  ? Cardiac Low-sodium Diet (No added sodium; 1500mg as indicated). If  Diabetic:  include carbohydrate controlled   ? Fluid restriction (if indicated)  ? Confirm follow-up appointments/transportation. Reschedule if needed. Additional Assessments   ?  Activity tolerance assessment   (eg: Vital signs; level of consciousness; dyspnea on exertion; pillow usage; recliner vs bed)  ? Energy conservation management (balance activity w/ rest)  ? Labs/diagnostics *as indicated  ? Medication Therapy *as directed  ? Diet/appetite assessment  Psychosocial: Reassurance and emotional support. Monitoring:  ? Home health  Education:  ? Advanced Care Planning status (follow up)   ? Patient/Caregiver verifies support systems (meal planning, medication and transportation needs, community resources)  ? Health literacy for heart failure  Discuss Discharge plan and/or next level of care               Have you been to an ER/Hospital since discharge from [facility]? No      Have you followed up with PCP/Cardiologist/Specialist? Has gone to follow up appointments    Transportation: daughter  Diet: cardiac/low sodium  Activity/ADLs: able to perform ADL's. Medications Reconciled at this time:  no  Home health:  Company/Completion: [employer] completed 5/24/18  Social Support: family    Advanced Care Plan: (addressed)    Patient reminded that there is a physician on call 24 hours a day / 7 days a week (M-F 5pm to 8am and from Friday 5pm until Monday 8a for the weekend) should the patient have questions or concerns. Patient reminded to call 911 if situation is emergent or patient feels the situation is emergent. Pt verbalizes understanding.

## 2018-06-05 ENCOUNTER — OFFICE VISIT (OUTPATIENT)
Dept: CARDIOLOGY CLINIC | Age: 83
End: 2018-06-05

## 2018-06-05 VITALS
WEIGHT: 207 LBS | DIASTOLIC BLOOD PRESSURE: 66 MMHG | BODY MASS INDEX: 34.49 KG/M2 | HEART RATE: 80 BPM | SYSTOLIC BLOOD PRESSURE: 113 MMHG | HEIGHT: 65 IN

## 2018-06-05 DIAGNOSIS — I10 ESSENTIAL HYPERTENSION: Chronic | ICD-10-CM

## 2018-06-05 DIAGNOSIS — E78.5 HYPERLIPIDEMIA, UNSPECIFIED HYPERLIPIDEMIA TYPE: ICD-10-CM

## 2018-06-05 DIAGNOSIS — I25.10 CORONARY ARTERY DISEASE INVOLVING NATIVE CORONARY ARTERY OF NATIVE HEART WITHOUT ANGINA PECTORIS: ICD-10-CM

## 2018-06-05 DIAGNOSIS — I50.32 CHRONIC DIASTOLIC CONGESTIVE HEART FAILURE (HCC): Primary | ICD-10-CM

## 2018-06-05 NOTE — PROGRESS NOTES
Patient brought medications list    1. Have you been to the ER, urgent care clinic since your last visit? Hospitalized since your last visit? No    2. Have you seen or consulted any other health care providers outside of the [address] [place] [address] since your last visit? Include any pap smears or colon screening.  Yes Where: PCP Reason for visit: Routine

## 2018-06-05 NOTE — LETTER
Cuong G[place] 1935 6/5/2018 Dear Berta Hodges MD 
 
I had the pleasure of evaluating  Ms. Montiel in office today. Below are the relevant portions of my assessment and plan of care. ICD-10-CM ICD-9-CM 1. Chronic diastolic congestive heart failure (HCC) I50.32 428.32   
  428.0 NYHA class II 2. Essential hypertension I10 401.9 3. Hyperlipidemia, unspecified hyperlipidemia type E78.5 272.4 4. Coronary artery disease involving native coronary artery of native heart without angina pectoris I25.10 414.01   
 mild with microvascular disease Current Outpatient Prescriptions Medication Sig Dispense Refill  amLODIPine (NORVASC) 10 mg tablet Take 1 Tab by mouth daily. Indications: hypertension 90 Tab 01  
 potassium chloride (KLOR-CON) 10 mEq tablet Take 1 Tab by mouth daily. 90 Tab 01  
 aspirin 81 mg chewable tablet Take 1 Tab by mouth daily. 30 Tab 0  
 oxyCODONE IR (ROXICODONE) 5 mg immediate release tablet Take 1 tablet by mouth every four (4) hours as needed (For pain rating 4-7). 60 tablet 0  
 carvedilol (COREG) 6.25 mg tablet Take 6.25 mg by mouth two (2) times daily (with meals).  torsemide (DEMADEX) 20 mg tablet Take 20 mg by mouth daily.  Calcium Carbonate-Vit D3-Min (CALTRATE 600+D PLUS MINERALS) 600 mg calcium- 400 unit Tab Take 1 Tab by mouth two (2) times a day.  ipratropium-albuterol (COMBIVENT)  mcg/actuation inhaler Take 2 Puffs by inhalation every six (6) hours as needed for Wheezing or Shortness of Breath.  gabapentin (NEURONTIN) 300 mg capsule Take 300 mg by mouth two (2) times a day.  lisinopril (PRINIVIL, ZESTRIL) 20 mg tablet Take 20 mg by mouth daily. Indications: HYPERTENSION    
 atorvastatin (LIPITOR) 40 mg tablet Take 40 mg by mouth daily. Indications: HYPERCHOLESTEROLEMIA  azelastine (OPTIVAR) 0.05 % ophthalmic solution Administer 1 Drop to both eyes two (2) times a day. Use in affected eye(s) No orders of the defined types were placed in this encounter. If you have questions, please do not hesitate to call me. I look forward to following Ms. Montiel along with you.  
 
Sincerely, 
Renée Damon MD

## 2018-06-05 NOTE — PROGRESS NOTES
HISTORY OF PRESENT ILLNESS  Jairon Lynn is a 80 y.o. female. HPI Comments: Recent admission with chf-non [facility]i  Feels better after discharge    CHF   The history is provided by the patient. This is a chronic problem. The current episode started more than 1 week ago. The problem occurs every several days. The problem has been gradually improving. Associated symptoms include shortness of breath. Pertinent negatives include no chest pain. The symptoms are aggravated by exertion. The symptoms are relieved by rest.       Review of Systems   Constitutional: Negative for chills and weight loss. HENT: Negative for congestion, ear discharge, ear pain, hearing loss, nosebleeds and tinnitus. Eyes: Negative for blurred vision. Respiratory: Positive for shortness of breath. Negative for wheezing and stridor. Cardiovascular: Negative for chest pain and leg swelling. Gastrointestinal: Negative for heartburn. Musculoskeletal: Negative for myalgias and neck pain. Skin: Negative for itching and rash. Neurological: Negative for tingling, tremors, focal weakness and loss of consciousness. Psychiatric/Behavioral: Negative for depression and suicidal ideas. No family history on file. Past Medical History:   Diagnosis Date    Arthritis     Hypercholesteremia     Hypertension     Nerve pain        Past Surgical History:   Procedure Laterality Date    HX CATARACT REMOVAL  2007 & 2008    bilateral    HX HYSTERECTOMY  1977    HX KNEE REPLACEMENT Right 2013    Dr. Hancock Fairly    \"slipped disc surgery\"       Social History   Substance Use Topics    Smoking status: Never Smoker    Smokeless tobacco: Never Used    Alcohol use Yes      Comment: glass of wine very seldom       Allergies   Allergen Reactions    Other Medication Itching and Swelling     Apples, pears, strawberries, cherries, any type of berries,avacado,pears,peaches,kiwi.   Patient states cherries make throat swell and the others make her throat itch. Outpatient Prescriptions Marked as Taking for the 6/5/18 encounter (Office Visit) with Rudy Noguera MD   Medication Sig Dispense Refill    amLODIPine (NORVASC) 10 mg tablet Take 1 Tab by mouth daily. Indications: hypertension 90 Tab 01    potassium chloride (KLOR-CON) 10 mEq tablet Take 1 Tab by mouth daily. 90 Tab 01    aspirin 81 mg chewable tablet Take 1 Tab by mouth daily. 30 Tab 0    oxyCODONE IR (ROXICODONE) 5 mg immediate release tablet Take 1 tablet by mouth every four (4) hours as needed (For pain rating 4-7). 60 tablet 0    carvedilol (COREG) 6.25 mg tablet Take 6.25 mg by mouth two (2) times daily (with meals).  torsemide (DEMADEX) 20 mg tablet Take 20 mg by mouth daily.  Calcium Carbonate-Vit D3-Min (CALTRATE 600+D PLUS MINERALS) 600 mg calcium- 400 unit Tab Take 1 Tab by mouth two (2) times a day.  ipratropium-albuterol (COMBIVENT)  mcg/actuation inhaler Take 2 Puffs by inhalation every six (6) hours as needed for Wheezing or Shortness of Breath.  gabapentin (NEURONTIN) 300 mg capsule Take 300 mg by mouth two (2) times a day.  lisinopril (PRINIVIL, ZESTRIL) 20 mg tablet Take 20 mg by mouth daily. Indications: HYPERTENSION      atorvastatin (LIPITOR) 40 mg tablet Take 40 mg by mouth daily. Indications: HYPERCHOLESTEROLEMIA          Visit Vitals    /66    Pulse 80    Ht 5' 5\" (1.651 m)    Wt 93.9 kg (207 lb)    BMI 34.45 kg/m2     Physical Exam   Constitutional: She is oriented to person, place, and time. She appears well-developed and well-nourished. No distress. HENT:   Head: Atraumatic. Mouth/Throat: No oropharyngeal exudate. Eyes: Conjunctivae are normal. Right eye exhibits no discharge. Left eye exhibits no discharge. No scleral icterus. Neck: Normal range of motion. Neck supple. No JVD present. No tracheal deviation present. No thyromegaly present.    Cardiovascular: Normal rate and regular rhythm. Exam reveals no gallop. No murmur heard. Pulmonary/Chest: Effort normal and breath sounds normal. No stridor. She has no wheezes. She has no rales. Abdominal: Soft. There is no tenderness. There is no rebound and no guarding. Musculoskeletal: Normal range of motion. She exhibits no edema or tenderness. Lymphadenopathy:     She has no cervical adenopathy. Neurological: She is alert and oriented to person, place, and time. She exhibits normal muscle tone. Skin: Skin is warm. She is not diaphoretic. Psychiatric: She has a normal mood and affect. Her behavior is normal.     ekg sinus rhythm with no acute st-t changes  I have personally reviewed patient's records available from hospital and other providers and incorporated findings in patient care. [facility]  Echo 11/2017:  SUMMARY:  Left ventricle: Systolic function was normal. Ejection fraction was  estimated to be 55 %. There were no regional wall motion abnormalities. There was mild concentric hypertrophy. Doppler parameters were consistent  with abnormal left ventricular relaxation (grade 1 diastolic dysfunction). Right ventricle: Systolic function was reduced. Mitral valve: There was mild annular calcification. There was mild  regurgitation. Tricuspid valve: There was mild regurgitation. Pulmonic valve: There was mild regurgitation. Aorta, systemic arteries: There was borderline dilatation of the ascending  aorta. ASSESSMENT and PLAN    ICD-10-CM ICD-9-CM    1. Chronic diastolic congestive heart failure (HCC) I50.32 428.32      428.0     NYHA class II   2. Essential hypertension I10 401.9    3. Hyperlipidemia, unspecified hyperlipidemia type E78.5 272.4    4. Coronary artery disease involving native coronary artery of native heart without angina pectoris I25.10 414.01     mild with microvascular disease     No orders of the defined types were placed in this encounter.     Follow-up Disposition:  Return in about 6 months (around 12/5/2018). Patient with chronic diastolic CHF- stable, NYHA class II. She is on optimal medical therapy. Advises salt restriction and weight reduction.   Follow up in 6 months

## 2018-06-05 NOTE — MR AVS SNAPSHOT
[facility] 
 
 
 [address] [facility] [address] [facility] [address] 
[phone] Patient: Sathya Powers MRN: [id number] Visit Information Date & Time Provider Department Dept. Phone Encounter #  
 6/5/2018 11:00 AM Holly Verdugo MD Cardiology [facility] DR JOSEFA PINON [phone] [id number] Follow-up Instructions Return in about 6 months (around 12/5/2018). Your Appointments 12/18/2018 11:15 AM  
Office Visit with Holly Verdugo MD  
[facility]) Appt Note: 6 months [address], [address] [facility] [address]  
[address] [facility], [address] [place] [address] Upcoming Health Maintenance Date Due DTaP/Tdap/Td series (1 - Tdap) 4/18/1956 ZOSTER VACCINE AGE 60> 2/18/1995 GLAUCOMA SCREENING Q2Y 4/18/2000 Bone Densitometry (Dexa) Screening 4/18/2000 Pneumococcal 65+ High/Highest Risk (1 of 2 - PCV13) 4/18/2000 MEDICARE YEARLY EXAM 3/14/2018 Influenza Age 5 to Adult 8/1/2018 Allergies as of 6/5/2018  Review Complete On: 6/5/2018 By: Aristides Resendiz' Severity Noted Reaction Type Reactions Other Medication  09/17/2013    Itching, Swelling Apples, pears, strawberries, cherries, any type of berries,avacado,pears,peaches,kiwi. Patient states cherries make throat swell and the others make her throat itch. Current Immunizations  Never Reviewed No immunizations on file. Not reviewed this visit You Were Diagnosed With   
  
 Codes Comments Chronic diastolic congestive heart failure (HCC)    -  Primary ICD-10-CM: I50.32 
ICD-9-CM: 428.32, 428.0 NYHA class II Essential hypertension     ICD-10-CM: I10 
ICD-9-CM: 401.9 Hyperlipidemia, unspecified hyperlipidemia type     ICD-10-CM: E78.5 ICD-9-CM: 272.4  Coronary artery disease involving native coronary artery of native heart without angina pectoris     ICD-10-CM: I25.10 ICD-9-CM: 414.01 mild with microvascular disease Vitals BP Pulse Height(growth percentile) Weight(growth percentile) BMI OB Status 113/66 80 5' 5\" (1.651 m) 207 lb (93.9 kg) 34.45 kg/m2 Hysterectomy Smoking Status Never Smoker Vitals History BMI and BSA Data Body Mass Index Body Surface Area 34.45 kg/m 2 2.08 m 2 Preferred Pharmacy Pharmacy Name Phone [facility] [phone] Your Updated Medication List  
  
   
This list is accurate as of 6/5/18 11:45 AM.  Always use your most recent med list. amLODIPine 10 mg tablet Commonly known as:  Mai Rusty Take 1 Tab by mouth daily. Indications: hypertension  
  
 aspirin 81 mg chewable tablet Take 1 Tab by mouth daily. azelastine 0.05 % ophthalmic solution Commonly known as:  OPTIVAR Administer 1 Drop to both eyes two (2) times a day. Use in affected eye(s) CALTRATE 600+D PLUS MINERALS 600 mg calcium- 400 unit Tab Generic drug:  Calcium Carbonate-Vit D3-Min Take 1 Tab by mouth two (2) times a day. COMBIVENT  mcg/actuation inhaler Generic drug:  ipratropium-albuterol Take 2 Puffs by inhalation every six (6) hours as needed for Wheezing or Shortness of Breath. COREG 6.25 mg tablet Generic drug:  carvedilol Take 6.25 mg by mouth two (2) times daily (with meals). gabapentin 300 mg capsule Commonly known as:  NEURONTIN Take 300 mg by mouth two (2) times a day. LIPITOR 40 mg tablet Generic drug:  atorvastatin Take 40 mg by mouth daily. Indications: HYPERCHOLESTEROLEMIA  
  
 lisinopril 20 mg tablet Commonly known as:  Ilene Gonzales Take 20 mg by mouth daily. Indications: HYPERTENSION  
  
 oxyCODONE IR 5 mg immediate release tablet Commonly known as:  José Miguel Mcdaniel  
 Take 1 tablet by mouth every four (4) hours as needed (For pain rating 4-7). potassium chloride 10 mEq tablet Commonly known as:  KLOR-CON Take 1 Tab by mouth daily. torsemide 20 mg tablet Commonly known as:  DEMADEX Take 20 mg by mouth daily. Follow-up Instructions Return in about 6 months (around 12/5/2018). Introducing \[facility] [place] [facility]\"" & HEALTH SERVICES! [employer] introduces [employer] patient portal. Now you can access parts of your medical record, email your doctor's office, and request medication refills online. 1. In your internet browser, go to [email or web address] 2. Click on the First Time User? Click Here link in the Sign In box. You will see the New Member Sign Up page. 3. Enter your [employer] Access Code exactly as it appears below. You will not need to use this code after youve completed the sign-up process. If you do not sign up before the expiration date, you must request a new code. · [employer] Access Code: [email or web address] Expires: 7/27/2018  5:27 PM 
 
4. Enter the last four digits of your Social Security Number (xxxx) and Date of Birth (mm/dd/yyyy) as indicated and click Submit. You will be taken to the next sign-up page. 5. Create a [employer] ID. This will be your [employer] login ID and cannot be changed, so think of one that is secure and easy to remember. 6. Create a [employer] password. You can change your password at any time. 7. Enter your Password Reset Question and Answer. This can be used at a later time if you forget your password. 8. Enter your e-mail address. You will receive e-mail notification when new information is available in [address] 9. Click Sign Up. You can now view and download portions of your medical record. 10. Click the Download Summary menu link to download a portable copy of your medical information.  
 
If you have questions, please visit the Frequently Asked Questions section of the [employer]. Remember, [employer] is NOT to be used for urgent needs. For medical emergencies, dial 911. Now available from your iPhone and Android! Please provide this summary of care documentation to your next provider. Your primary care clinician is listed as [facility]. If you have any questions after today's visit, please call [phone].

## 2018-06-07 ENCOUNTER — PATIENT OUTREACH (OUTPATIENT)
Dept: CARDIOLOGY CLINIC | Age: 83
End: 2018-06-07

## 2018-06-12 ENCOUNTER — PATIENT OUTREACH (OUTPATIENT)
Dept: CARDIOLOGY CLINIC | Age: 83
End: 2018-06-12

## 2018-06-12 NOTE — PROGRESS NOTES
NN contacted the patient by telephone to perform CHF follow Up. Noted Priorities/Plan:  Susy Leiva to  scale at Dr. Adam Nunes office. Daily Weight: no scale  Zone: green   Signs/Symptoms: Edema none at this time; SOB none at this time. Goals      Maintains daily weight. 5/1/18 Patient will verbalize understanding of daily weights by 5/30/18 5/14/18 Patient has been unable to weigh herself, her scale needed a new battery. Daughter is to replace battery today. 5/22/18 Patient did replace battery, but the scale still isn't working properly showing weight lose of 60 lbs. Patient stated that she would have daughter buy a new scale. Patient is feeling that her clothes are looser and her body feels slimmer. No signs of edema noted. 5/30/18 Patient has not gotten a new scale, daughter states she will get on the weekend. She states Ms. Jo Ann Villa has not experienced any edema or SOB.   6/12/18 Patient did not get a scale, offered for her son to  scale at cardiology's office. Patient to have son Kishor To go to office to .  Patient verbalizes understanding of self-management goals of living with Congestive Heart Failure            5/1/18 Patient will verbalize understanding of heart failure zones and when to call physician/NN for red flags by 5/30/18       Understands and adheres to diet. 5/1/18 Patient will demonstrate understanding of low sodium diet and read food labels to monitor sodium intake by 6/15/18            Needs addressed from pathway:   Week 5-8   Provide Daily Disease Management (patient/caregiver initiated)  ? Daily weight (Before Breakfast  ? Daily Zone Identification (symptom management; weight, edema, SOB, activity/sleep changes)-notify provider immediately as indicated  ? Cardiac Low-sodium Diet (No added sodium; 1500mg as indicated). If  Diabetic:  include carbohydrate controlled   ? Fluid restriction (if indicated)  ?  Comorbidity Management  Additional Assessments  ? Fall precautions    ? Activity tolerance assessment   (eg: Vital signs; level of consciousness; dyspnea on exertion; pillow usage; recliner vs bed)  ? Energy conservation management (balance activity w/ rest)  Psychosocial: Reassurance and emotional support.t  Education:  ? Advanced Care Planning status (follow up)   ? Patient/Caregiver verifies support systems (meal planning, medication and transportation needs, community resources)  ? Health literacy for heart failure  Discuss Discharge plan and/or next level of care               Have you been to an ER/Hospital since discharge from [facility]? No      Have you followed up with PCP/Cardiologist/Specialist? Has attended follow up appointments    Transportation: daughter  Diet: cardiac/low sodium  Activity/ADLs: able to perform ADL's. Medications Reconciled at this time:  At Dr Lizz Rust office 6/5/18  Home health:  Company/Completion: [employer] completed 5/24/18  Social Support: family    Advanced Care Plan: (addressed)    Patient reminded that there is a physician on call 24 hours a day / 7 days a week (M-F 5pm to 8am and from Friday 5pm until Monday 8a for the weekend) should the patient have questions or concerns. Patient reminded to call 911 if situation is emergent or patient feels the situation is emergent. Pt verbalizes understanding.

## 2018-06-20 ENCOUNTER — PATIENT OUTREACH (OUTPATIENT)
Dept: CARDIOLOGY CLINIC | Age: 83
End: 2018-06-20

## 2018-06-26 ENCOUNTER — PATIENT OUTREACH (OUTPATIENT)
Dept: CASE MANAGEMENT | Age: 83
End: 2018-06-26

## 2018-06-26 NOTE — PROGRESS NOTES
Confirmed scale purchase. Reports she weighed 209 today yesterday was 211. Reports some swelling in lower extremities but was currently elevating them and felt the swelling was getting better. Denies shortness of breath or chest pain. Provided an opportunity for questions or to address any needs. Patient understands to call cardiologist or go to the nearest emergency room if swelling gets worse and if she develops shortness of breath or chest pain. Denies any other needs at this time.

## 2018-07-19 ENCOUNTER — PATIENT OUTREACH (OUTPATIENT)
Dept: CARDIOLOGY CLINIC | Age: 83
End: 2018-07-19

## 2018-07-19 NOTE — PROGRESS NOTES
Patient has graduated from the Disease Specific Care Management  program on 7/19/18. Patient's symptoms are stable at this time. Patient/family has the ability to self-manage. Care management goals have been completed at this time. No further nurse navigator follow up scheduled. Goals Addressed             Most Recent     Maintains daily weight. On track (7/19/2018)             5/1/18 Patient will verbalize understanding of daily weights by 5/30/18 5/14/18 Patient has been unable to weigh herself, her scale needed a new battery. Daughter is to replace battery today. 5/22/18 Patient did replace battery, but the scale still isn't working properly showing weight lose of 60 lbs. Patient stated that she would have daughter buy a new scale. Patient is feeling that her clothes are looser and her body feels slimmer. No signs of edema noted. 5/30/18 Patient has not gotten a new scale, daughter states she will get on the weekend. She states Ms. Carmita Ribera has not experienced any edema or SOB.   6/12/18 Patient did not get a scale, offered for her son to  scale at cardiology's office. Patient to have son Sandhya Pablo go to office to .  Patient verbalizes understanding of self-management goals of living with Congestive Heart Failure   On track (7/19/2018)             5/1/18 Patient will verbalize understanding of heart failure zones and when to call physician/NN for red flags by 5/30/18       Understands and adheres to diet. On track (7/19/2018)             5/1/18 Patient will demonstrate understanding of low sodium diet and read food labels to monitor sodium intake by 6/15/18            Pt has nurse navigator's contact information for any further questions, concerns, or needs.   Patients upcoming visits:  Future Appointments  Date Time Provider Reddy Olguin   12/18/2018 11:15 AM You Lee MD [facility] [address]

## 2018-08-27 ENCOUNTER — PATIENT OUTREACH (OUTPATIENT)
Dept: CARDIOLOGY CLINIC | Age: 83
End: 2018-08-27

## 2018-08-31 ENCOUNTER — HOSPITAL ENCOUNTER (OUTPATIENT)
Dept: LAB | Age: 83
Discharge: HOME | End: 2018-08-31
Payer: MEDICARE

## 2018-08-31 LAB
ALBUMIN SERPL-MCNC: 3.8 G/DL (ref 3.4–5)
ALBUMIN/GLOB SERPL: 1.2 {RATIO} (ref 0.8–1.7)
ALP SERPL-CCNC: 74 U/L (ref 45–117)
ALT SERPL-CCNC: 19 U/L (ref 13–56)
ANION GAP SERPL CALC-SCNC: 7 MMOL/L (ref 3–18)
AST SERPL-CCNC: 20 U/L (ref 15–37)
BASOPHILS # BLD: 0 K/UL (ref 0–0.1)
BASOPHILS NFR BLD: 0 % (ref 0–2)
BILIRUB SERPL-MCNC: 0.8 MG/DL (ref 0.2–1)
BUN SERPL-MCNC: 16 MG/DL (ref 7–18)
BUN/CREAT SERPL: 14 (ref 12–20)
CALCIUM SERPL-MCNC: 9.4 MG/DL (ref 8.5–10.1)
CHLORIDE SERPL-SCNC: 107 MMOL/L (ref 100–108)
CHOLEST SERPL-MCNC: 110 MG/DL
CK SERPL-CCNC: 198 U/L (ref 26–192)
CO2 SERPL-SCNC: 32 MMOL/L (ref 21–32)
CREAT SERPL-MCNC: 1.15 MG/DL (ref 0.6–1.3)
DIFFERENTIAL METHOD BLD: ABNORMAL
EOSINOPHIL # BLD: 0.2 K/UL (ref 0–0.4)
EOSINOPHIL NFR BLD: 3 % (ref 0–5)
ERYTHROCYTE [DISTWIDTH] IN BLOOD BY AUTOMATED COUNT: 14 % (ref 11.6–14.5)
GLOBULIN SER CALC-MCNC: 3.3 G/DL (ref 2–4)
GLUCOSE SERPL-MCNC: 80 MG/DL (ref 74–99)
HCT VFR BLD AUTO: 37.1 % (ref 35–45)
HDLC SERPL-MCNC: 45 MG/DL (ref 40–60)
HDLC SERPL: 2.4 {RATIO} (ref 0–5)
HGB BLD-MCNC: 11.8 G/DL (ref 12–16)
LDLC SERPL CALC-MCNC: 51.4 MG/DL (ref 0–100)
LIPID PROFILE,FLP: NORMAL
LYMPHOCYTES # BLD: 2.6 K/UL (ref 0.9–3.6)
LYMPHOCYTES NFR BLD: 35 % (ref 21–52)
MCH RBC QN AUTO: 29.3 PG (ref 24–34)
MCHC RBC AUTO-ENTMCNC: 31.8 G/DL (ref 31–37)
MCV RBC AUTO: 92.1 FL (ref 74–97)
MONOCYTES # BLD: 0.8 K/UL (ref 0.05–1.2)
MONOCYTES NFR BLD: 11 % (ref 3–10)
NEUTS SEG # BLD: 3.7 K/UL (ref 1.8–8)
NEUTS SEG NFR BLD: 51 % (ref 40–73)
PLATELET # BLD AUTO: 173 K/UL (ref 135–420)
PMV BLD AUTO: 11.3 FL (ref 9.2–11.8)
POTASSIUM SERPL-SCNC: 4.4 MMOL/L (ref 3.5–5.5)
PROT SERPL-MCNC: 7.1 G/DL (ref 6.4–8.2)
RBC # BLD AUTO: 4.03 M/UL (ref 4.2–5.3)
SODIUM SERPL-SCNC: 146 MMOL/L (ref 136–145)
TRIGL SERPL-MCNC: 68 MG/DL (ref ?–150)
URATE SERPL-MCNC: 6.4 MG/DL (ref 2.6–7.2)
VLDLC SERPL CALC-MCNC: 13.6 MG/DL
WBC # BLD AUTO: 7.3 K/UL (ref 4.6–13.2)

## 2018-08-31 PROCEDURE — 84550 ASSAY OF BLOOD/URIC ACID: CPT | Performed by: INTERNAL MEDICINE

## 2018-08-31 PROCEDURE — 80053 COMPREHEN METABOLIC PANEL: CPT | Performed by: INTERNAL MEDICINE

## 2018-08-31 PROCEDURE — 82550 ASSAY OF CK (CPK): CPT | Performed by: INTERNAL MEDICINE

## 2018-08-31 PROCEDURE — 36415 COLL VENOUS BLD VENIPUNCTURE: CPT | Performed by: INTERNAL MEDICINE

## 2018-08-31 PROCEDURE — 85025 COMPLETE CBC W/AUTO DIFF WBC: CPT | Performed by: INTERNAL MEDICINE

## 2018-08-31 PROCEDURE — 80061 LIPID PANEL: CPT | Performed by: INTERNAL MEDICINE

## 2018-09-17 ENCOUNTER — HOSPITAL ENCOUNTER (OUTPATIENT)
Dept: MAMMOGRAPHY | Age: 83
Discharge: HOME | End: 2018-09-17
Attending: INTERNAL MEDICINE
Payer: MEDICARE

## 2018-09-17 DIAGNOSIS — Z12.31 VISIT FOR SCREENING MAMMOGRAM: ICD-10-CM

## 2018-09-17 PROCEDURE — 77063 BREAST TOMOSYNTHESIS BI: CPT

## 2018-10-30 RX ORDER — AMLODIPINE BESYLATE 10 MG/1
TABLET ORAL
Qty: 90 TAB | Refills: 1 | Status: SHIPPED | OUTPATIENT
Start: 2018-10-30

## 2018-12-03 ENCOUNTER — HOSPITAL ENCOUNTER (OUTPATIENT)
Dept: LAB | Age: 83
Discharge: HOME | End: 2018-12-03
Payer: MEDICARE

## 2018-12-03 LAB
25(OH)D3 SERPL-MCNC: 41.4 NG/ML (ref 30–100)
ALBUMIN SERPL-MCNC: 4 G/DL (ref 3.4–5)
ALBUMIN/GLOB SERPL: 1.1 {RATIO} (ref 0.8–1.7)
ALP SERPL-CCNC: 80 U/L (ref 45–117)
ALT SERPL-CCNC: 22 U/L (ref 13–56)
ANION GAP SERPL CALC-SCNC: 9 MMOL/L (ref 3–18)
AST SERPL-CCNC: 19 U/L (ref 15–37)
BILIRUB SERPL-MCNC: 0.6 MG/DL (ref 0.2–1)
BUN SERPL-MCNC: 23 MG/DL (ref 7–18)
BUN/CREAT SERPL: 20 (ref 12–20)
CALCIUM SERPL-MCNC: 9.4 MG/DL (ref 8.5–10.1)
CHLORIDE SERPL-SCNC: 106 MMOL/L (ref 100–108)
CHOLEST SERPL-MCNC: 139 MG/DL
CK SERPL-CCNC: 214 U/L (ref 26–192)
CO2 SERPL-SCNC: 31 MMOL/L (ref 21–32)
CREAT SERPL-MCNC: 1.15 MG/DL (ref 0.6–1.3)
CREAT UR-MCNC: 158 MG/DL (ref 30–125)
GLOBULIN SER CALC-MCNC: 3.7 G/DL (ref 2–4)
GLUCOSE SERPL-MCNC: 84 MG/DL (ref 74–99)
HDLC SERPL-MCNC: 52 MG/DL (ref 40–60)
HDLC SERPL: 2.7 {RATIO} (ref 0–5)
LDLC SERPL CALC-MCNC: 69.4 MG/DL (ref 0–100)
LIPID PROFILE,FLP: NORMAL
MICROALBUMIN UR-MCNC: 0.72 MG/DL (ref 0–3)
MICROALBUMIN/CREAT UR-RTO: 5 MG/G (ref 0–30)
PHOSPHATE SERPL-MCNC: 3.9 MG/DL (ref 2.5–4.9)
POTASSIUM SERPL-SCNC: 3.7 MMOL/L (ref 3.5–5.5)
PROT SERPL-MCNC: 7.7 G/DL (ref 6.4–8.2)
SODIUM SERPL-SCNC: 146 MMOL/L (ref 136–145)
TRIGL SERPL-MCNC: 88 MG/DL (ref ?–150)
TSH SERPL DL<=0.05 MIU/L-ACNC: 1.76 UIU/ML (ref 0.36–3.74)
URATE SERPL-MCNC: 7.5 MG/DL (ref 2.6–7.2)
VLDLC SERPL CALC-MCNC: 17.6 MG/DL

## 2018-12-03 PROCEDURE — 84550 ASSAY OF BLOOD/URIC ACID: CPT

## 2018-12-03 PROCEDURE — 80053 COMPREHEN METABOLIC PANEL: CPT

## 2018-12-03 PROCEDURE — 84443 ASSAY THYROID STIM HORMONE: CPT

## 2018-12-03 PROCEDURE — 36415 COLL VENOUS BLD VENIPUNCTURE: CPT

## 2018-12-03 PROCEDURE — 82043 UR ALBUMIN QUANTITATIVE: CPT

## 2018-12-03 PROCEDURE — 82306 VITAMIN D 25 HYDROXY: CPT

## 2018-12-03 PROCEDURE — 80061 LIPID PANEL: CPT

## 2018-12-03 PROCEDURE — 84100 ASSAY OF PHOSPHORUS: CPT

## 2018-12-03 PROCEDURE — 82550 ASSAY OF CK (CPK): CPT

## 2018-12-03 PROCEDURE — 82652 VIT D 1 25-DIHYDROXY: CPT

## 2018-12-04 LAB — 1,25(OH)2D3 SERPL-MCNC: 35.4 PG/ML (ref 19.9–79.3)

## 2018-12-18 ENCOUNTER — OFFICE VISIT (OUTPATIENT)
Dept: CARDIOLOGY CLINIC | Age: 83
End: 2018-12-18

## 2018-12-18 VITALS
HEART RATE: 62 BPM | HEIGHT: 65 IN | DIASTOLIC BLOOD PRESSURE: 68 MMHG | SYSTOLIC BLOOD PRESSURE: 138 MMHG | WEIGHT: 204 LBS | BODY MASS INDEX: 33.99 KG/M2

## 2018-12-18 DIAGNOSIS — E78.5 HYPERLIPIDEMIA, UNSPECIFIED HYPERLIPIDEMIA TYPE: ICD-10-CM

## 2018-12-18 DIAGNOSIS — I10 ESSENTIAL HYPERTENSION: Primary | ICD-10-CM

## 2018-12-18 DIAGNOSIS — I50.32 CHRONIC DIASTOLIC CONGESTIVE HEART FAILURE (HCC): ICD-10-CM

## 2018-12-18 DIAGNOSIS — I25.10 CORONARY ARTERY DISEASE INVOLVING NATIVE CORONARY ARTERY OF NATIVE HEART WITHOUT ANGINA PECTORIS: ICD-10-CM

## 2018-12-18 NOTE — PROGRESS NOTES
1. Have you been to the ER, urgent care clinic since your last visit? Hospitalized since your last visit? No    2. Have you seen or consulted any other health care providers outside of the [address] [place] [address] since your last visit? Include any pap smears or colon screening.  Yes Where: PCP Reason for visit: Routine

## 2018-12-18 NOTE — PROGRESS NOTES
HISTORY OF PRESENT ILLNESS  Tia Davila is a 80 y.o. female. Recent admission with chf-non stemi  Feels better after discharge      CHF   The history is provided by the patient. This is a chronic problem. The current episode started more than 1 week ago. The problem occurs every several days. The problem has been gradually improving. Associated symptoms include shortness of breath. Pertinent negatives include no chest pain. The symptoms are aggravated by exertion. The symptoms are relieved by rest.       Review of Systems   Constitutional: Negative for chills and weight loss. HENT: Negative for congestion, ear discharge, ear pain, hearing loss, nosebleeds and tinnitus. Eyes: Negative for blurred vision. Respiratory: Positive for shortness of breath. Negative for wheezing and stridor. Cardiovascular: Negative for chest pain and leg swelling. Gastrointestinal: Negative for heartburn. Musculoskeletal: Negative for myalgias and neck pain. Skin: Negative for itching and rash. Neurological: Negative for tingling, tremors, focal weakness and loss of consciousness. Psychiatric/Behavioral: Negative for depression and suicidal ideas. No family history on file.     Past Medical History:   Diagnosis Date    Arthritis     Hypercholesteremia     Hypertension     Nerve pain        Past Surgical History:   Procedure Laterality Date    HX CATARACT REMOVAL  2007 & 2008    bilateral    HX HYSTERECTOMY  1977    HX KNEE REPLACEMENT Right 2013    Dr. Orellana [address], [address]    \"slipped disc surgery\"       Social History     Tobacco Use    Smoking status: Never Smoker    Smokeless tobacco: Never Used   Substance Use Topics    Alcohol use: Yes     Frequency: Monthly or less     Drinks per session: 1 or 2     Binge frequency: Never     Comment: glass of wine very seldom       Allergies   Allergen Reactions    Other Medication Itching and Swelling     Apples, pears, strawberries, cherries, any type of berries,avacado,pears,peaches,kiwi. Patient states cherries make throat swell and the others make her throat itch. Outpatient Medications Marked as Taking for the 12/18/18 encounter (Office Visit) with Bibi Sepulveda MD   Medication Sig Dispense Refill    amLODIPine (NORVASC) 10 mg tablet TAKE 1 TABLET DAILY FOR HYPERTENSION 90 Tab 1    aspirin 81 mg chewable tablet Take 1 Tab by mouth daily. 30 Tab 0    carvedilol (COREG) 6.25 mg tablet Take 6.25 mg by mouth two (2) times daily (with meals).  torsemide (DEMADEX) 20 mg tablet Take 20 mg by mouth daily.  Calcium Carbonate-Vit D3-Min (CALTRATE 600+D PLUS MINERALS) 600 mg calcium- 400 unit Tab Take 1 Tab by mouth two (2) times a day.  ipratropium-albuterol (COMBIVENT)  mcg/actuation inhaler Take 2 Puffs by inhalation every six (6) hours as needed for Wheezing or Shortness of Breath.  gabapentin (NEURONTIN) 300 mg capsule Take 300 mg by mouth two (2) times a day.  lisinopril (PRINIVIL, ZESTRIL) 20 mg tablet Take 20 mg by mouth daily. Indications: HYPERTENSION      atorvastatin (LIPITOR) 40 mg tablet Take 40 mg by mouth daily. Indications: HYPERCHOLESTEROLEMIA          Visit Vitals  /68   Pulse 62   Ht 5' 5\" (1.651 m)   Wt 92.5 kg (204 lb)   BMI 33.95 kg/m²     Physical Exam   Constitutional: She is oriented to person, place, and time. She appears well-developed and well-nourished. No distress. HENT:   Head: Atraumatic. Mouth/Throat: No oropharyngeal exudate. Eyes: Conjunctivae are normal. Right eye exhibits no discharge. Left eye exhibits no discharge. No scleral icterus. Neck: Normal range of motion. Neck supple. No JVD present. No tracheal deviation present. No thyromegaly present. Cardiovascular: Normal rate and regular rhythm. Exam reveals no gallop. No murmur heard. Pulmonary/Chest: Effort normal and breath sounds normal. No stridor. She has no wheezes. She has no rales.    Abdominal: Soft. There is no tenderness. There is no rebound and no guarding. Musculoskeletal: Normal range of motion. She exhibits no edema or tenderness. Lymphadenopathy:     She has no cervical adenopathy. Neurological: She is alert and oriented to person, place, and time. She exhibits normal muscle tone. Skin: Skin is warm. She is not diaphoretic. Psychiatric: She has a normal mood and affect. Her behavior is normal.     ekg sinus rhythm with no acute st-t changes  I have personally reviewed patient's records available from hospital and other providers and incorporated findings in patient care. [facility]  Echo 11/2017:  SUMMARY:  Left ventricle: Systolic function was normal. Ejection fraction was  estimated to be 55 %. There were no regional wall motion abnormalities. There was mild concentric hypertrophy. Doppler parameters were consistent  with abnormal left ventricular relaxation (grade 1 diastolic dysfunction). Right ventricle: Systolic function was reduced. Mitral valve: There was mild annular calcification. There was mild  regurgitation. Tricuspid valve: There was mild regurgitation. Pulmonic valve: There was mild regurgitation. Aorta, systemic arteries: There was borderline dilatation of the ascending  aorta. ASSESSMENT and PLAN    ICD-10-CM ICD-9-CM    1. Essential hypertension I10 401.9    2. Hyperlipidemia, unspecified hyperlipidemia type E78.5 272.4    3. Chronic diastolic congestive heart failure (HCC) I50.32 428.32      428.0     NYHA class II   4. Coronary artery disease involving native coronary artery of native heart without angina pectoris I25.10 414.01      No orders of the defined types were placed in this encounter. Follow-up Disposition:  Return in about 6 months (around 6/18/2019). Patient with chronic diastolic CHF- stable, NYHA class II. She is on optimal medical therapy. Advises salt restriction and weight reduction.   Follow up in 6 months

## 2019-03-25 ENCOUNTER — HOSPITAL ENCOUNTER (OUTPATIENT)
Dept: LAB | Age: 84
Discharge: HOME | End: 2019-03-25
Payer: MEDICARE

## 2019-03-25 LAB
ALBUMIN SERPL-MCNC: 3.9 G/DL (ref 3.4–5)
ALBUMIN/GLOB SERPL: 1.2 {RATIO} (ref 0.8–1.7)
ALP SERPL-CCNC: 77 U/L (ref 45–117)
ALT SERPL-CCNC: 21 U/L (ref 13–56)
ANION GAP SERPL CALC-SCNC: 3 MMOL/L (ref 3–18)
AST SERPL-CCNC: 22 U/L (ref 15–37)
BASOPHILS # BLD: 0 K/UL (ref 0–0.1)
BASOPHILS NFR BLD: 0 % (ref 0–2)
BILIRUB SERPL-MCNC: 0.6 MG/DL (ref 0.2–1)
BUN SERPL-MCNC: 27 MG/DL (ref 7–18)
BUN/CREAT SERPL: 18 (ref 12–20)
CA-I SERPL-SCNC: 1.2 MMOL/L (ref 1.12–1.32)
CALCIUM SERPL-MCNC: 9.5 MG/DL (ref 8.5–10.1)
CHLORIDE SERPL-SCNC: 105 MMOL/L (ref 100–108)
CHOLEST SERPL-MCNC: 116 MG/DL
CK SERPL-CCNC: 242 U/L (ref 26–192)
CO2 SERPL-SCNC: 31 MMOL/L (ref 21–32)
CREAT SERPL-MCNC: 1.47 MG/DL (ref 0.6–1.3)
DIFFERENTIAL METHOD BLD: ABNORMAL
EOSINOPHIL # BLD: 0.2 K/UL (ref 0–0.4)
EOSINOPHIL NFR BLD: 3 % (ref 0–5)
ERYTHROCYTE [DISTWIDTH] IN BLOOD BY AUTOMATED COUNT: 13.9 % (ref 11.6–14.5)
GLOBULIN SER CALC-MCNC: 3.2 G/DL (ref 2–4)
GLUCOSE SERPL-MCNC: 85 MG/DL (ref 74–99)
HCT VFR BLD AUTO: 37.4 % (ref 35–45)
HDLC SERPL-MCNC: 44 MG/DL (ref 40–60)
HDLC SERPL: 2.6 {RATIO} (ref 0–5)
HGB BLD-MCNC: 12 G/DL (ref 12–16)
LDLC SERPL CALC-MCNC: 59.2 MG/DL (ref 0–100)
LIPID PROFILE,FLP: NORMAL
LYMPHOCYTES # BLD: 2.1 K/UL (ref 0.9–3.6)
LYMPHOCYTES NFR BLD: 28 % (ref 21–52)
MAGNESIUM SERPL-MCNC: 2.4 MG/DL (ref 1.6–2.6)
MCH RBC QN AUTO: 30 PG (ref 24–34)
MCHC RBC AUTO-ENTMCNC: 32.1 G/DL (ref 31–37)
MCV RBC AUTO: 93.5 FL (ref 74–97)
MONOCYTES # BLD: 0.7 K/UL (ref 0.05–1.2)
MONOCYTES NFR BLD: 10 % (ref 3–10)
NEUTS SEG # BLD: 4.4 K/UL (ref 1.8–8)
NEUTS SEG NFR BLD: 59 % (ref 40–73)
PHOSPHATE SERPL-MCNC: 3.6 MG/DL (ref 2.5–4.9)
PLATELET # BLD AUTO: 195 K/UL (ref 135–420)
PMV BLD AUTO: 11.1 FL (ref 9.2–11.8)
POTASSIUM SERPL-SCNC: 4.4 MMOL/L (ref 3.5–5.5)
PROT SERPL-MCNC: 7.1 G/DL (ref 6.4–8.2)
RBC # BLD AUTO: 4 M/UL (ref 4.2–5.3)
SODIUM SERPL-SCNC: 139 MMOL/L (ref 136–145)
TRIGL SERPL-MCNC: 64 MG/DL (ref ?–150)
URATE SERPL-MCNC: 7.5 MG/DL (ref 2.6–7.2)
VLDLC SERPL CALC-MCNC: 12.8 MG/DL
WBC # BLD AUTO: 7.5 K/UL (ref 4.6–13.2)

## 2019-03-25 PROCEDURE — 84550 ASSAY OF BLOOD/URIC ACID: CPT

## 2019-03-25 PROCEDURE — 80053 COMPREHEN METABOLIC PANEL: CPT

## 2019-03-25 PROCEDURE — 82330 ASSAY OF CALCIUM: CPT

## 2019-03-25 PROCEDURE — 84100 ASSAY OF PHOSPHORUS: CPT

## 2019-03-25 PROCEDURE — 80061 LIPID PANEL: CPT

## 2019-03-25 PROCEDURE — 82550 ASSAY OF CK (CPK): CPT

## 2019-03-25 PROCEDURE — 83735 ASSAY OF MAGNESIUM: CPT

## 2019-03-25 PROCEDURE — 36415 COLL VENOUS BLD VENIPUNCTURE: CPT

## 2019-03-25 PROCEDURE — 85025 COMPLETE CBC W/AUTO DIFF WBC: CPT

## 2019-06-18 ENCOUNTER — OFFICE VISIT (OUTPATIENT)
Dept: CARDIOLOGY CLINIC | Age: 84
End: 2019-06-18

## 2019-06-18 VITALS
WEIGHT: 200.2 LBS | HEART RATE: 89 BPM | BODY MASS INDEX: 33.36 KG/M2 | SYSTOLIC BLOOD PRESSURE: 103 MMHG | HEIGHT: 65 IN | DIASTOLIC BLOOD PRESSURE: 51 MMHG

## 2019-06-18 DIAGNOSIS — I50.32 CHRONIC DIASTOLIC CONGESTIVE HEART FAILURE (HCC): Primary | ICD-10-CM

## 2019-06-18 DIAGNOSIS — E78.5 HYPERLIPIDEMIA, UNSPECIFIED HYPERLIPIDEMIA TYPE: ICD-10-CM

## 2019-06-18 DIAGNOSIS — I25.10 CORONARY ARTERY DISEASE INVOLVING NATIVE CORONARY ARTERY OF NATIVE HEART WITHOUT ANGINA PECTORIS: ICD-10-CM

## 2019-06-18 DIAGNOSIS — I10 ESSENTIAL HYPERTENSION: ICD-10-CM

## 2019-06-18 NOTE — PROGRESS NOTES
HISTORY OF PRESENT ILLNESS  Evelyn Swenson is a 80 y.o. female. Recent admission with chf-non stemi  Feels better after discharge    CHF   The history is provided by the patient. This is a chronic problem. The current episode started more than 1 week ago. The problem occurs every several days. The problem has been gradually improving. Associated symptoms include shortness of breath. Pertinent negatives include no chest pain. The symptoms are aggravated by exertion. The symptoms are relieved by rest.   Hypertension   Associated symptoms include shortness of breath. Pertinent negatives include no chest pain. Review of Systems   Constitutional: Negative for chills and weight loss. HENT: Negative for congestion, ear discharge, ear pain, hearing loss, nosebleeds and tinnitus. Eyes: Negative for blurred vision. Respiratory: Positive for shortness of breath. Negative for wheezing and stridor. Cardiovascular: Negative for chest pain and leg swelling. Gastrointestinal: Negative for heartburn. Musculoskeletal: Negative for myalgias and neck pain. Skin: Negative for itching and rash. Neurological: Negative for tingling, tremors, focal weakness and loss of consciousness. Psychiatric/Behavioral: Negative for depression and suicidal ideas. No family history on file.     Past Medical History:   Diagnosis Date    Arthritis     Hypercholesteremia     Hypertension     Nerve pain        Past Surgical History:   Procedure Laterality Date    HX CATARACT REMOVAL  2007 & 2008    bilateral    HX HYSTERECTOMY  1977    HX KNEE REPLACEMENT Right 2013    Dr. Jessica Rees [address] [place] [address],  [address]    \"slipped disc surgery\"       Social History     Tobacco Use    Smoking status: Never Smoker    Smokeless tobacco: Never Used   Substance Use Topics    Alcohol use: Yes     Frequency: Monthly or less     Drinks per session: 1 or 2     Binge frequency: Never     Comment: glass of wine very seldom Allergies   Allergen Reactions    Other Medication Itching and Swelling     Apples, pears, strawberries, cherries, any type of berries,avacado,pears,peaches,kiwi. Patient states cherries make throat swell and the others make her throat itch. Outpatient Medications Marked as Taking for the 6/18/19 encounter (Office Visit) with Prasanna Smith MD   Medication Sig Dispense Refill    amLODIPine (NORVASC) 10 mg tablet TAKE 1 TABLET DAILY FOR HYPERTENSION 90 Tab 1    potassium chloride (KLOR-CON) 10 mEq tablet Take 1 Tab by mouth daily. 90 Tab 01    aspirin 81 mg chewable tablet Take 1 Tab by mouth daily. 30 Tab 0    oxyCODONE IR (ROXICODONE) 5 mg immediate release tablet Take 1 tablet by mouth every four (4) hours as needed (For pain rating 4-7). 60 tablet 0    carvedilol (COREG) 6.25 mg tablet Take 6.25 mg by mouth two (2) times daily (with meals).  torsemide (DEMADEX) 20 mg tablet Take 20 mg by mouth daily.  Calcium Carbonate-Vit D3-Min (CALTRATE 600+D PLUS MINERALS) 600 mg calcium- 400 unit Tab Take 1 Tab by mouth two (2) times a day.  ipratropium-albuterol (COMBIVENT)  mcg/actuation inhaler Take 2 Puffs by inhalation every six (6) hours as needed for Wheezing or Shortness of Breath.  gabapentin (NEURONTIN) 300 mg capsule Take 300 mg by mouth two (2) times a day.  lisinopril (PRINIVIL, ZESTRIL) 20 mg tablet Take 20 mg by mouth daily. Indications: HYPERTENSION      atorvastatin (LIPITOR) 40 mg tablet Take 40 mg by mouth daily. Indications: HYPERCHOLESTEROLEMIA          Visit Vitals  /51   Pulse 89   Ht 5' 5\" (1.651 m)   Wt 90.8 kg (200 lb 3.2 oz)   BMI 33.32 kg/m²     Physical Exam   Constitutional: She is oriented to person, place, and time. She appears well-developed and well-nourished. No distress. HENT:   Head: Atraumatic. Mouth/Throat: No oropharyngeal exudate. Eyes: Conjunctivae are normal. Right eye exhibits no discharge.  Left eye exhibits no discharge. No scleral icterus. Neck: Normal range of motion. Neck supple. No JVD present. No tracheal deviation present. No thyromegaly present. Cardiovascular: Normal rate and regular rhythm. Exam reveals no gallop. No murmur heard. Pulmonary/Chest: Effort normal and breath sounds normal. No stridor. She has no wheezes. She has no rales. Abdominal: Soft. There is no tenderness. There is no rebound and no guarding. Musculoskeletal: Normal range of motion. She exhibits edema (ankle edema). She exhibits no tenderness. Lymphadenopathy:     She has no cervical adenopathy. Neurological: She is alert and oriented to person, place, and time. She exhibits normal muscle tone. Skin: Skin is warm. She is not diaphoretic. Psychiatric: She has a normal mood and affect. Her behavior is normal.     ekg sinus rhythm with no acute st-t changes  I have personally reviewed patient's records available from hospital and other providers and incorporated findings in patient care. [facility]  Echo 11/2017:  SUMMARY:  Left ventricle: Systolic function was normal. Ejection fraction was  estimated to be 55 %. There were no regional wall motion abnormalities. There was mild concentric hypertrophy. Doppler parameters were consistent  with abnormal left ventricular relaxation (grade 1 diastolic dysfunction). Right ventricle: Systolic function was reduced. Mitral valve: There was mild annular calcification. There was mild  regurgitation. Tricuspid valve: There was mild regurgitation. Pulmonic valve: There was mild regurgitation. Aorta, systemic arteries: There was borderline dilatation of the ascending  Aorta. ASSESSMENT and PLAN    ICD-10-CM ICD-9-CM    1. Chronic diastolic congestive heart failure (HCC) I50.32 428.32      428.0     NYHA class II/III   2. Essential hypertension I10 401.9 AMB POC EKG ROUTINE W/ 12 LEADS, INTER & REP   3. Hyperlipidemia, unspecified hyperlipidemia type E78.5 272.4    4.  Coronary artery disease involving native coronary artery of native heart without angina pectoris I25.10 414.01      Orders Placed This Encounter    AMB POC EKG ROUTINE W/ 12 LEADS, INTER & REP     Order Specific Question:   Reason for Exam:     Answer:   Hypertension     Follow-up and Dispositions    · Return in about 6 months (around 12/18/2019). Patient with chronic diastolic CHF- stable, NYHA class II. Recent cardiac cath revealed microvascular diease with no significant stenosis. She is on optimal medical therapy. Advises salt restriction and weight reduction. Ankle swelling likely from norvasc.   Follow up in 6 months

## 2019-06-18 NOTE — PROGRESS NOTES
1. Have you been to the ER, urgent care clinic since your last visit? Hospitalized since your last visit?     no    2. Have you seen or consulted any other health care providers outside of the [address] [place] [address] since your last visit? Include any pap smears or colon screening.       No

## 2019-07-08 ENCOUNTER — HOSPITAL ENCOUNTER (OUTPATIENT)
Dept: LAB | Age: 84
Discharge: HOME | End: 2019-07-08
Payer: MEDICARE

## 2019-07-08 LAB
ALBUMIN SERPL-MCNC: 4 G/DL (ref 3.4–5)
ALBUMIN/GLOB SERPL: 1.2 {RATIO} (ref 0.8–1.7)
ALP SERPL-CCNC: 78 U/L (ref 45–117)
ALT SERPL-CCNC: 16 U/L (ref 13–56)
ANION GAP SERPL CALC-SCNC: 6 MMOL/L (ref 3–18)
APPEARANCE UR: CLEAR
AST SERPL-CCNC: 21 U/L (ref 15–37)
BASOPHILS # BLD: 0 K/UL (ref 0–0.1)
BASOPHILS NFR BLD: 0 % (ref 0–2)
BILIRUB SERPL-MCNC: 0.7 MG/DL (ref 0.2–1)
BILIRUB UR QL: NEGATIVE
BUN SERPL-MCNC: 15 MG/DL (ref 7–18)
BUN/CREAT SERPL: 12 (ref 12–20)
CALCIUM SERPL-MCNC: 9.4 MG/DL (ref 8.5–10.1)
CALCIUM SERPL-MCNC: 9.6 MG/DL (ref 8.5–10.1)
CHLORIDE SERPL-SCNC: 108 MMOL/L (ref 100–108)
CHOLEST SERPL-MCNC: 126 MG/DL
CK SERPL-CCNC: 194 U/L (ref 26–192)
CO2 SERPL-SCNC: 32 MMOL/L (ref 21–32)
COLOR UR: YELLOW
CREAT SERPL-MCNC: 1.24 MG/DL (ref 0.6–1.3)
CREAT UR-MCNC: 148 MG/DL (ref 30–125)
DIFFERENTIAL METHOD BLD: ABNORMAL
EOSINOPHIL # BLD: 0.3 K/UL (ref 0–0.4)
EOSINOPHIL NFR BLD: 4 % (ref 0–5)
ERYTHROCYTE [DISTWIDTH] IN BLOOD BY AUTOMATED COUNT: 13.9 % (ref 11.6–14.5)
GLOBULIN SER CALC-MCNC: 3.3 G/DL (ref 2–4)
GLUCOSE SERPL-MCNC: 76 MG/DL (ref 74–99)
GLUCOSE UR STRIP.AUTO-MCNC: NEGATIVE MG/DL
HCT VFR BLD AUTO: 36.5 % (ref 35–45)
HDLC SERPL-MCNC: 45 MG/DL (ref 40–60)
HDLC SERPL: 2.8 {RATIO} (ref 0–5)
HGB BLD-MCNC: 11.9 G/DL (ref 12–16)
HGB UR QL STRIP: NEGATIVE
KETONES UR QL STRIP.AUTO: NEGATIVE MG/DL
LDLC SERPL CALC-MCNC: 64.4 MG/DL (ref 0–100)
LEUKOCYTE ESTERASE UR QL STRIP.AUTO: NEGATIVE
LIPID PROFILE,FLP: NORMAL
LYMPHOCYTES # BLD: 2.5 K/UL (ref 0.9–3.6)
LYMPHOCYTES NFR BLD: 34 % (ref 21–52)
MCH RBC QN AUTO: 30.1 PG (ref 24–34)
MCHC RBC AUTO-ENTMCNC: 32.6 G/DL (ref 31–37)
MCV RBC AUTO: 92.2 FL (ref 74–97)
MICROALBUMIN UR-MCNC: 0.63 MG/DL (ref 0–3)
MICROALBUMIN/CREAT UR-RTO: 4 MG/G (ref 0–30)
MONOCYTES # BLD: 0.6 K/UL (ref 0.05–1.2)
MONOCYTES NFR BLD: 8 % (ref 3–10)
NEUTS SEG # BLD: 3.9 K/UL (ref 1.8–8)
NEUTS SEG NFR BLD: 54 % (ref 40–73)
NITRITE UR QL STRIP.AUTO: NEGATIVE
PH UR STRIP: 7 [PH] (ref 5–8)
PHOSPHATE SERPL-MCNC: 3.5 MG/DL (ref 2.5–4.9)
PLATELET # BLD AUTO: 174 K/UL (ref 135–420)
PMV BLD AUTO: 11.1 FL (ref 9.2–11.8)
POTASSIUM SERPL-SCNC: 3.9 MMOL/L (ref 3.5–5.5)
PROT SERPL-MCNC: 7.3 G/DL (ref 6.4–8.2)
PROT UR STRIP-MCNC: NEGATIVE MG/DL
PTH-INTACT SERPL-MCNC: 86.9 PG/ML (ref 18.4–88)
RBC # BLD AUTO: 3.96 M/UL (ref 4.2–5.3)
SODIUM SERPL-SCNC: 146 MMOL/L (ref 136–145)
SP GR UR REFRACTOMETRY: 1.01 (ref 1–1.03)
TRIGL SERPL-MCNC: 83 MG/DL (ref ?–150)
URATE SERPL-MCNC: 7.4 MG/DL (ref 2.6–7.2)
UROBILINOGEN UR QL STRIP.AUTO: 1 EU/DL (ref 0.2–1)
VLDLC SERPL CALC-MCNC: 16.6 MG/DL
WBC # BLD AUTO: 7.2 K/UL (ref 4.6–13.2)

## 2019-07-08 PROCEDURE — 36415 COLL VENOUS BLD VENIPUNCTURE: CPT

## 2019-07-08 PROCEDURE — 84550 ASSAY OF BLOOD/URIC ACID: CPT

## 2019-07-08 PROCEDURE — 84100 ASSAY OF PHOSPHORUS: CPT

## 2019-07-08 PROCEDURE — 81003 URINALYSIS AUTO W/O SCOPE: CPT

## 2019-07-08 PROCEDURE — 80061 LIPID PANEL: CPT

## 2019-07-08 PROCEDURE — 82550 ASSAY OF CK (CPK): CPT

## 2019-07-08 PROCEDURE — 85025 COMPLETE CBC W/AUTO DIFF WBC: CPT

## 2019-07-08 PROCEDURE — 80053 COMPREHEN METABOLIC PANEL: CPT

## 2019-07-08 PROCEDURE — 83970 ASSAY OF PARATHORMONE: CPT

## 2019-07-08 PROCEDURE — 82043 UR ALBUMIN QUANTITATIVE: CPT

## 2019-10-07 ENCOUNTER — HOSPITAL ENCOUNTER (OUTPATIENT)
Dept: LAB | Age: 84
Discharge: HOME | End: 2019-10-07
Payer: MEDICARE

## 2019-10-07 LAB
ALBUMIN SERPL-MCNC: 3.7 G/DL (ref 3.4–5)
ALBUMIN/GLOB SERPL: 1.1 {RATIO} (ref 0.8–1.7)
ALP SERPL-CCNC: 75 U/L (ref 45–117)
ALT SERPL-CCNC: 17 U/L (ref 13–56)
ANION GAP SERPL CALC-SCNC: 5 MMOL/L (ref 3–18)
AST SERPL-CCNC: 16 U/L (ref 10–38)
BASOPHILS # BLD: 0 K/UL (ref 0–0.1)
BASOPHILS NFR BLD: 0 % (ref 0–2)
BILIRUB SERPL-MCNC: 0.7 MG/DL (ref 0.2–1)
BUN SERPL-MCNC: 16 MG/DL (ref 7–18)
BUN/CREAT SERPL: 13 (ref 12–20)
CALCIUM SERPL-MCNC: 9.1 MG/DL (ref 8.5–10.1)
CHLORIDE SERPL-SCNC: 108 MMOL/L (ref 100–111)
CHOLEST SERPL-MCNC: 128 MG/DL
CK SERPL-CCNC: 161 U/L (ref 26–192)
CO2 SERPL-SCNC: 31 MMOL/L (ref 21–32)
CREAT SERPL-MCNC: 1.2 MG/DL (ref 0.6–1.3)
DIFFERENTIAL METHOD BLD: ABNORMAL
EOSINOPHIL # BLD: 0.3 K/UL (ref 0–0.4)
EOSINOPHIL NFR BLD: 5 % (ref 0–5)
ERYTHROCYTE [DISTWIDTH] IN BLOOD BY AUTOMATED COUNT: 14.2 % (ref 11.6–14.5)
GLOBULIN SER CALC-MCNC: 3.4 G/DL (ref 2–4)
GLUCOSE SERPL-MCNC: 81 MG/DL (ref 74–99)
HCT VFR BLD AUTO: 35.9 % (ref 35–45)
HDLC SERPL-MCNC: 48 MG/DL (ref 40–60)
HDLC SERPL: 2.7 {RATIO} (ref 0–5)
HGB BLD-MCNC: 11.5 G/DL (ref 12–16)
LDLC SERPL CALC-MCNC: 62.2 MG/DL (ref 0–100)
LIPID PROFILE,FLP: NORMAL
LYMPHOCYTES # BLD: 1.9 K/UL (ref 0.9–3.6)
LYMPHOCYTES NFR BLD: 33 % (ref 21–52)
MAGNESIUM SERPL-MCNC: 2 MG/DL (ref 1.6–2.6)
MCH RBC QN AUTO: 29.8 PG (ref 24–34)
MCHC RBC AUTO-ENTMCNC: 32 G/DL (ref 31–37)
MCV RBC AUTO: 93 FL (ref 74–97)
MONOCYTES # BLD: 0.5 K/UL (ref 0.05–1.2)
MONOCYTES NFR BLD: 9 % (ref 3–10)
NEUTS SEG # BLD: 3.1 K/UL (ref 1.8–8)
NEUTS SEG NFR BLD: 53 % (ref 40–73)
PLATELET # BLD AUTO: 199 K/UL (ref 135–420)
PMV BLD AUTO: 10.7 FL (ref 9.2–11.8)
POTASSIUM SERPL-SCNC: 3.8 MMOL/L (ref 3.5–5.5)
PROT SERPL-MCNC: 7.1 G/DL (ref 6.4–8.2)
RBC # BLD AUTO: 3.86 M/UL (ref 4.2–5.3)
SODIUM SERPL-SCNC: 144 MMOL/L (ref 136–145)
TRIGL SERPL-MCNC: 89 MG/DL (ref ?–150)
URATE SERPL-MCNC: 7.5 MG/DL (ref 2.6–7.2)
VLDLC SERPL CALC-MCNC: 17.8 MG/DL
WBC # BLD AUTO: 5.9 K/UL (ref 4.6–13.2)

## 2019-10-07 PROCEDURE — 36415 COLL VENOUS BLD VENIPUNCTURE: CPT

## 2019-10-07 PROCEDURE — 80053 COMPREHEN METABOLIC PANEL: CPT

## 2019-10-07 PROCEDURE — 83735 ASSAY OF MAGNESIUM: CPT

## 2019-10-07 PROCEDURE — 85025 COMPLETE CBC W/AUTO DIFF WBC: CPT

## 2019-10-07 PROCEDURE — 82550 ASSAY OF CK (CPK): CPT

## 2019-10-07 PROCEDURE — 84550 ASSAY OF BLOOD/URIC ACID: CPT

## 2019-10-07 PROCEDURE — 80061 LIPID PANEL: CPT

## 2019-11-25 ENCOUNTER — HOSPITAL ENCOUNTER (OUTPATIENT)
Dept: MAMMOGRAPHY | Age: 84
Discharge: HOME | End: 2019-11-25
Attending: INTERNAL MEDICINE
Payer: MEDICARE

## 2019-11-25 DIAGNOSIS — Z12.31 VISIT FOR SCREENING MAMMOGRAM: ICD-10-CM

## 2019-11-25 PROCEDURE — 77063 BREAST TOMOSYNTHESIS BI: CPT

## 2019-12-11 ENCOUNTER — APPOINTMENT (OUTPATIENT)
Dept: CT IMAGING | Age: 84
End: 2019-12-11
Attending: STUDENT IN AN ORGANIZED HEALTH CARE EDUCATION/TRAINING PROGRAM
Payer: MEDICARE

## 2019-12-11 ENCOUNTER — APPOINTMENT (OUTPATIENT)
Dept: GENERAL RADIOLOGY | Age: 84
End: 2019-12-11
Attending: PHYSICIAN ASSISTANT
Payer: MEDICARE

## 2019-12-11 ENCOUNTER — HOSPITAL ENCOUNTER (EMERGENCY)
Age: 84
Discharge: HOME | End: 2019-12-11
Attending: EMERGENCY MEDICINE
Payer: MEDICARE

## 2019-12-11 VITALS
HEART RATE: 90 BPM | RESPIRATION RATE: 17 BRPM | SYSTOLIC BLOOD PRESSURE: 119 MMHG | OXYGEN SATURATION: 92 % | DIASTOLIC BLOOD PRESSURE: 60 MMHG | TEMPERATURE: 98.4 F

## 2019-12-11 DIAGNOSIS — R06.02 SOB (SHORTNESS OF BREATH): Primary | ICD-10-CM

## 2019-12-11 DIAGNOSIS — J06.9 UPPER RESPIRATORY TRACT INFECTION, UNSPECIFIED TYPE: ICD-10-CM

## 2019-12-11 LAB
ALBUMIN SERPL-MCNC: 3.6 G/DL (ref 3.4–5)
ALBUMIN/GLOB SERPL: 0.8 {RATIO} (ref 0.8–1.7)
ALP SERPL-CCNC: 82 U/L (ref 45–117)
ALT SERPL-CCNC: 17 U/L (ref 13–56)
ANION GAP SERPL CALC-SCNC: 6 MMOL/L (ref 3–18)
AST SERPL-CCNC: 20 U/L (ref 10–38)
BASOPHILS # BLD: 0 K/UL (ref 0–0.1)
BASOPHILS NFR BLD: 0 % (ref 0–2)
BILIRUB SERPL-MCNC: 1.3 MG/DL (ref 0.2–1)
BNP SERPL-MCNC: 510 PG/ML (ref 0–1800)
BUN SERPL-MCNC: 25 MG/DL (ref 7–18)
BUN/CREAT SERPL: 16 (ref 12–20)
CALCIUM SERPL-MCNC: 9.1 MG/DL (ref 8.5–10.1)
CHLORIDE SERPL-SCNC: 104 MMOL/L (ref 100–111)
CO2 SERPL-SCNC: 33 MMOL/L (ref 21–32)
CREAT SERPL-MCNC: 1.53 MG/DL (ref 0.6–1.3)
DIFFERENTIAL METHOD BLD: ABNORMAL
EOSINOPHIL # BLD: 0.2 K/UL (ref 0–0.4)
EOSINOPHIL NFR BLD: 1 % (ref 0–5)
ERYTHROCYTE [DISTWIDTH] IN BLOOD BY AUTOMATED COUNT: 14.6 % (ref 11.6–14.5)
FLUAV AG NPH QL IA: NEGATIVE
FLUBV AG NOSE QL IA: NEGATIVE
GLOBULIN SER CALC-MCNC: 4.5 G/DL (ref 2–4)
GLUCOSE SERPL-MCNC: 117 MG/DL (ref 74–99)
HCT VFR BLD AUTO: 36.3 % (ref 35–45)
HGB BLD-MCNC: 11.6 G/DL (ref 12–16)
LYMPHOCYTES # BLD: 1.7 K/UL (ref 0.9–3.6)
LYMPHOCYTES NFR BLD: 12 % (ref 21–52)
MAGNESIUM SERPL-MCNC: 2.3 MG/DL (ref 1.6–2.6)
MCH RBC QN AUTO: 29.7 PG (ref 24–34)
MCHC RBC AUTO-ENTMCNC: 32 G/DL (ref 31–37)
MCV RBC AUTO: 93.1 FL (ref 74–97)
MONOCYTES # BLD: 1.1 K/UL (ref 0.05–1.2)
MONOCYTES NFR BLD: 8 % (ref 3–10)
NEUTS SEG # BLD: 10.6 K/UL (ref 1.8–8)
NEUTS SEG NFR BLD: 79 % (ref 40–73)
PLATELET # BLD AUTO: 167 K/UL (ref 135–420)
PMV BLD AUTO: 11 FL (ref 9.2–11.8)
POTASSIUM SERPL-SCNC: 4.2 MMOL/L (ref 3.5–5.5)
PROT SERPL-MCNC: 8.1 G/DL (ref 6.4–8.2)
RBC # BLD AUTO: 3.9 M/UL (ref 4.2–5.3)
SODIUM SERPL-SCNC: 143 MMOL/L (ref 136–145)
TROPONIN I SERPL-MCNC: 0.02 NG/ML (ref 0–0.04)
WBC # BLD AUTO: 13.5 K/UL (ref 4.6–13.2)

## 2019-12-11 PROCEDURE — 71250 CT THORAX DX C-: CPT

## 2019-12-11 PROCEDURE — 85025 COMPLETE CBC W/AUTO DIFF WBC: CPT

## 2019-12-11 PROCEDURE — 71045 X-RAY EXAM CHEST 1 VIEW: CPT

## 2019-12-11 PROCEDURE — 80053 COMPREHEN METABOLIC PANEL: CPT

## 2019-12-11 PROCEDURE — 83880 ASSAY OF NATRIURETIC PEPTIDE: CPT

## 2019-12-11 PROCEDURE — 99285 EMERGENCY DEPT VISIT HI MDM: CPT

## 2019-12-11 PROCEDURE — 83735 ASSAY OF MAGNESIUM: CPT

## 2019-12-11 PROCEDURE — 87804 INFLUENZA ASSAY W/OPTIC: CPT

## 2019-12-11 PROCEDURE — 93005 ELECTROCARDIOGRAM TRACING: CPT

## 2019-12-11 PROCEDURE — 84484 ASSAY OF TROPONIN QUANT: CPT

## 2019-12-11 NOTE — ED PROVIDER NOTES
Patient is an 79-year-old female with a past medical history of hypercholesterolemia, hypertension, chronic kidney disease and CHF presents with progressively worsening shortness of breath since last night. Patient states that last night when she was sitting in her chair she noticed a progressive onset of shortness of breath that was worse with exertion. She remained in her chair. She states that the shortness of breath is similar to that which she experienced when she was hospitalized with a CHF exacerbation in the past.  She states that she was recently around her granddaughter who has a viral URI. Additionally, she states that prior to the onset of her shortness of breath she was experiencing rhinorrhea with very slight intermittent nausea and chills last night. She denied any chest pain, headache, syncope, fevers, vomiting, diarrhea, lower leg swelling, prolonged immobility, or urinary symptoms. She states that she did have her flu shot this year. Patient has no history of COPD and is on oxygen           Past Medical History:   Diagnosis Date    Arthritis     Hypercholesteremia     Hypertension     Nerve pain        Past Surgical History:   Procedure Laterality Date    HX CATARACT REMOVAL  2007 & 2008    bilateral    HX HYSTERECTOMY  1977    HX KNEE REPLACEMENT Right 2013    Dr. Virginia Lang    \"slipped disc surgery\"         No family history on file.     Social History     Socioeconomic History    Marital status: SINGLE     Spouse name: Not on file    Number of children: Not on file    Years of education: Not on file    Highest education level: Not on file   Occupational History    Not on file   Social Needs    Financial resource strain: Not on file    Food insecurity:     Worry: Not on file     Inability: Not on file    Transportation needs:     Medical: Not on file     Non-medical: Not on file   Tobacco Use    Smoking status: Never Smoker    Smokeless tobacco: Never Used   Substance and Sexual Activity    Alcohol use: Yes     Frequency: Monthly or less     Drinks per session: 1 or 2     Binge frequency: Never     Comment: glass of wine very seldom    Drug use: No    Sexual activity: Not on file   Lifestyle    Physical activity:     Days per week: Not on file     Minutes per session: Not on file    Stress: Not on file   Relationships    Social connections:     Talks on phone: Not on file     Gets together: Not on file     Attends Yazidism service: Not on file     Active member of club or organization: Not on file     Attends meetings of clubs or organizations: Not on file     Relationship status: Not on file    Intimate partner violence:     Fear of current or ex partner: Not on file     Emotionally abused: Not on file     Physically abused: Not on file     Forced sexual activity: Not on file   Other Topics Concern    Not on file   Social History Narrative    Not on file         ALLERGIES: Other medication    Review of Systems   Constitutional: Positive for chills. Negative for diaphoresis, fatigue and fever. HENT: Positive for rhinorrhea. Respiratory: Positive for shortness of breath. Negative for cough and chest tightness. Cardiovascular: Negative for chest pain, palpitations and leg swelling. Gastrointestinal: Negative for abdominal pain, diarrhea, nausea and vomiting. Genitourinary: Negative for dysuria, flank pain and hematuria. Neurological: Negative for dizziness, syncope, light-headedness and headaches. Vitals:    12/11/19 1700 12/11/19 1715 12/11/19 1730 12/11/19 1800   BP: 103/61 95/72 109/56 (!) 87/61   Pulse: 96 97 94 95   Resp: 24 21 22 26   Temp:       SpO2: 98% 99% 93% 99%            Physical Exam  Vitals signs and nursing note reviewed. Constitutional:       General: She is awake. She is not in acute distress. Appearance: Normal appearance. She is overweight. She is not ill-appearing, toxic-appearing or diaphoretic. Cardiovascular:      Rate and Rhythm: Normal rate and regular rhythm. Pulses:           Radial pulses are 2+ on the right side and 2+ on the left side. Heart sounds: Normal heart sounds. Pulmonary:      Effort: Pulmonary effort is normal.      Breath sounds: Normal breath sounds. Musculoskeletal:      Right lower leg: No edema. Left lower leg: No edema. Neurological:      Mental Status: She is alert. Psychiatric:         Behavior: Behavior is cooperative. MDM  Number of Diagnoses or Management Options  SOB (shortness of breath):   Upper respiratory tract infection, unspecified type:   Diagnosis management comments: She is an 43-year-old female the past medical history of hypertension, chronic kidney disease and CHF who presents to the ED with a 1 day history of aggressively worsening shortness of breath. Differential diagnosis is broad to include acute viral versus bacterial infectious process as well as acute cardiovascular process such as MI, pneumothorax or exacerbation of chronic process such as CHF. Patient was hemodynamically stable and afebrile and was satting 95% on 2 L nasal cannula when I initially saw her. Physical examination was unremarkable. Patient states that she was feeling better on the oxygen and that she is not on home oxygen. Oxygen was turned off and patient continue with oxygen saturation in the 90s. Labs with leukocytosis of 13.6 and a left shift. CMP with no major electrolyte abnormalities. Initial troponin  0.02. BNP in the 500s. Chest x-ray negative for any acute cardiopulmonary process. This most likely represents an elderly female with multiple core morbidities and sick contact who presented with increasing shortness of breath from viral URI picture but not in acute respiratory distress. Rapid flu negative. CT was ordered due to her multiple comorbidities and age showed no acute or definite consolidations.   Patient was discharged home with strict return precautions.            Procedures

## 2019-12-12 LAB
ATRIAL RATE: 89 BPM
CALCULATED P AXIS, ECG09: 15 DEGREES
CALCULATED R AXIS, ECG10: 2 DEGREES
CALCULATED T AXIS, ECG11: 60 DEGREES
DIAGNOSIS, 93000: NORMAL
P-R INTERVAL, ECG05: 116 MS
Q-T INTERVAL, ECG07: 382 MS
QRS DURATION, ECG06: 76 MS
QTC CALCULATION (BEZET), ECG08: 464 MS
VENTRICULAR RATE, ECG03: 89 BPM

## 2019-12-12 NOTE — DISCHARGE INSTRUCTIONS
Patient Education        Shortness of Breath: Care Instructions  Your Care Instructions  Shortness of breath has many causes. Sometimes conditions such as anxiety can lead to shortness of breath. Some people get mild shortness of breath when they exercise. Trouble breathing also can be a symptom of a serious problem, such as asthma, lung disease, emphysema, heart problems, and pneumonia. If your shortness of breath continues, you may need tests and treatment. Watch for any changes in your breathing and other symptoms. Follow-up care is a key part of your treatment and safety. Be sure to make and go to all appointments, and call your doctor if you are having problems. It's also a good idea to know your test results and keep a list of the medicines you take. How can you care for yourself at home? · Do not smoke or allow others to smoke around you. If you need help quitting, talk to your doctor about stop-smoking programs and medicines. These can increase your chances of quitting for good. · Get plenty of rest and sleep. · Take your medicines exactly as prescribed. Call your doctor if you think you are having a problem with your medicine. · Find healthy ways to deal with stress. ? Exercise daily. ? Get plenty of sleep. ? Eat regularly and well. When should you call for help? Call 911 anytime you think you may need emergency care. For example, call if:    · You have severe shortness of breath.     · You have symptoms of a heart attack. These may include:  ? Chest pain or pressure, or a strange feeling in the chest.  ? Sweating. ? Shortness of breath. ? Nausea or vomiting. ? Pain, pressure, or a strange feeling in the back, neck, jaw, or upper belly or in one or both shoulders or arms. ? Lightheadedness or sudden weakness. ? A fast or irregular heartbeat. After you call 911, the  may tell you to chew 1 adult-strength or 2 to 4 low-dose aspirin. Wait for an ambulance.  Do not try to drive yourself.    Call your doctor now or seek immediate medical care if:    · Your shortness of breath gets worse or you start to wheeze. Wheezing is a high-pitched sound when you breathe.     · You wake up at night out of breath or have to prop your head up on several pillows to breathe.     · You are short of breath after only light activity or while at rest.    Watch closely for changes in your health, and be sure to contact your doctor if:    · You do not get better over the next 1 to 2 days. Where can you learn more? Go to http://vic-kobi.info/. Enter S780 in the search box to learn more about \"Shortness of Breath: Care Instructions. \"  Current as of: June 9, 2019  Content Version: 12.2  © 2876-2076 [employer]. Care instructions adapted under license by [employer] (which disclaims liability or warranty for this information). If you have questions about a medical condition or this instruction, always ask your healthcare professional. [place] [address] any warranty or liability for your use of this information. Patient Education        Viral Respiratory Infection: Care Instructions  Your Care Instructions    Viruses are very small organisms. They grow in number after they enter your body. There are many types that cause different illnesses, such as colds and the mumps. The symptoms of a viral respiratory infection often start quickly. They include a fever, sore throat, and runny nose. You may also just not feel well. Or you may not want to eat much. Most viral respiratory infections are not serious. They usually get better with time and self-care. Antibiotics are not used to treat a viral infection. That's because antibiotics will not help cure a viral illness. In some cases, antiviral medicine can help your body fight a serious viral infection. Follow-up care is a key part of your treatment and safety.  Be sure to make and go to all appointments, and call your doctor if you are having problems. It's also a good idea to know your test results and keep a list of the medicines you take. How can you care for yourself at home? · Rest as much as possible until you feel better. · Be safe with medicines. Take your medicine exactly as prescribed. Call your doctor if you think you are having a problem with your medicine. You will get more details on the specific medicine your doctor prescribes. · Take an over-the-counter pain medicine, such as acetaminophen (Tylenol), ibuprofen (Advil, Motrin), or naproxen (Aleve), as needed for pain and fever. Read and follow all instructions on the label. Do not give aspirin to anyone younger than 20. It has been linked to Reye syndrome, a serious illness. · Drink plenty of fluids, enough so that your urine is light yellow or clear like water. Hot fluids, such as tea or soup, may help relieve congestion in your nose and throat. If you have kidney, heart, or liver disease and have to limit fluids, talk with your doctor before you increase the amount of fluids you drink. · Try to clear mucus from your lungs by breathing deeply and coughing. · Gargle with warm salt water once an hour. This can help reduce swelling and throat pain. Use 1 teaspoon of salt mixed in 1 cup of warm water. · Do not smoke or allow others to smoke around you. If you need help quitting, talk to your doctor about stop-smoking programs and medicines. These can increase your chances of quitting for good. To avoid spreading the virus  · Cough or sneeze into a tissue. Then throw the tissue away. · If you don't have a tissue, use your hand to cover your cough or sneeze. Then clean your hand. You can also cough into your sleeve. · Wash your hands often. Use soap and warm water. Wash for 15 to 20 seconds each time. · If you don't have soap and water near you, you can clean your hands with alcohol wipes or gel.   When should you call for help?  Call your doctor now or seek immediate medical care if:    · You have a new or higher fever.     · Your fever lasts more than 48 hours.     · You have trouble breathing.     · You have a fever with a stiff neck or a severe headache.     · You are sensitive to light.     · You feel very sleepy or confused.    Watch closely for changes in your health, and be sure to contact your doctor if:    · You do not get better as expected. Where can you learn more? Go to http://vic-kobi.info/. Enter O071 in the search box to learn more about \"Viral Respiratory Infection: Care Instructions. \"  Current as of: June 9, 2019  Content Version: 12.2  © 2205-0543 [employer]. Care instructions adapted under license by [employer] (which disclaims liability or warranty for this information). If you have questions about a medical condition or this instruction, always ask your healthcare professional. [address] any warranty or liability for your use of this information.

## 2019-12-13 ENCOUNTER — OFFICE VISIT (OUTPATIENT)
Dept: CARDIOLOGY CLINIC | Age: 84
End: 2019-12-13

## 2019-12-13 VITALS
DIASTOLIC BLOOD PRESSURE: 55 MMHG | WEIGHT: 182.4 LBS | HEIGHT: 65 IN | BODY MASS INDEX: 30.39 KG/M2 | SYSTOLIC BLOOD PRESSURE: 105 MMHG | HEART RATE: 76 BPM

## 2019-12-13 DIAGNOSIS — I10 ESSENTIAL HYPERTENSION: ICD-10-CM

## 2019-12-13 DIAGNOSIS — E78.5 HYPERLIPIDEMIA, UNSPECIFIED HYPERLIPIDEMIA TYPE: ICD-10-CM

## 2019-12-13 DIAGNOSIS — I25.10 CORONARY ARTERY DISEASE INVOLVING NATIVE CORONARY ARTERY OF NATIVE HEART WITHOUT ANGINA PECTORIS: ICD-10-CM

## 2019-12-13 DIAGNOSIS — I50.32 CHRONIC DIASTOLIC CONGESTIVE HEART FAILURE (HCC): Primary | ICD-10-CM

## 2019-12-13 NOTE — PROGRESS NOTES
1. Have you been to the ER, urgent care clinic since your last visit? Hospitalized since your last visit? Yes When: 12/11/19 Where: [facility] Reason for visit: Shortness of breath    2. Have you seen or consulted any other health care providers outside of the [address] [place] [address] since your last visit? Include any pap smears or colon screening.  No

## 2019-12-20 NOTE — PROGRESS NOTES
HISTORY OF PRESENT ILLNESS  Carlos Montague is a 80 y.o. female. Recent admission with chf-non stemi  Feels better after discharge    CHF   The history is provided by the patient. This is a chronic problem. The current episode started more than 1 week ago. The problem occurs every several days. The problem has been gradually improving. Associated symptoms include shortness of breath. Pertinent negatives include no chest pain. The symptoms are aggravated by exertion. The symptoms are relieved by rest.   Hypertension   Associated symptoms include shortness of breath. Pertinent negatives include no chest pain. Review of Systems   Constitutional: Negative for chills and weight loss. HENT: Negative for congestion, ear discharge, ear pain, hearing loss, nosebleeds and tinnitus. Eyes: Negative for blurred vision. Respiratory: Positive for shortness of breath. Negative for wheezing and stridor. Cardiovascular: Negative for chest pain and leg swelling. Gastrointestinal: Negative for heartburn. Musculoskeletal: Negative for myalgias and neck pain. Skin: Negative for itching and rash. Neurological: Negative for tingling, tremors, focal weakness and loss of consciousness. Psychiatric/Behavioral: Negative for depression and suicidal ideas. No family history on file.     Past Medical History:   Diagnosis Date    Arthritis     Hypercholesteremia     Hypertension     Nerve pain        Past Surgical History:   Procedure Laterality Date    HX CATARACT REMOVAL  2007 & 2008    bilateral    HX HYSTERECTOMY  1977    HX KNEE REPLACEMENT Right 2013    Dr. Regina Wooten [address] [place] [address],  [address]    \"slipped disc surgery\"       Social History     Tobacco Use    Smoking status: Never Smoker    Smokeless tobacco: Never Used   Substance Use Topics    Alcohol use: Not Currently     Frequency: Monthly or less     Drinks per session: 1 or 2     Binge frequency: Never     Comment: glass of wine very seldom Allergies   Allergen Reactions    Other Medication Itching and Swelling     Apples, pears, strawberries, cherries, any type of berries,[place],pears,peaches,kiwi. Patient states cherries make throat swell and the others make her throat itch. Outpatient Medications Marked as Taking for the 12/13/19 encounter (Office Visit) with Sheila Henderson MD   Medication Sig Dispense Refill    amLODIPine (NORVASC) 10 mg tablet TAKE 1 TABLET DAILY FOR HYPERTENSION 90 Tab 1    aspirin 81 mg chewable tablet Take 1 Tab by mouth daily. 30 Tab 0    carvedilol (COREG) 6.25 mg tablet Take 6.25 mg by mouth two (2) times daily (with meals).  torsemide (DEMADEX) 20 mg tablet Take 20 mg by mouth daily.  Calcium Carbonate-Vit D3-Min (CALTRATE 600+D PLUS MINERALS) 600 mg calcium- 400 unit Tab Take 1 Tab by mouth two (2) times a day.  ipratropium-albuterol (COMBIVENT)  mcg/actuation inhaler Take 2 Puffs by inhalation every six (6) hours as needed for Wheezing or Shortness of Breath.  gabapentin (NEURONTIN) 300 mg capsule Take 300 mg by mouth two (2) times a day.  lisinopril (PRINIVIL, ZESTRIL) 20 mg tablet Take 20 mg by mouth daily. Indications: HYPERTENSION      atorvastatin (LIPITOR) 40 mg tablet Take 40 mg by mouth daily. Indications: HYPERCHOLESTEROLEMIA          Visit Vitals  /55   Pulse 76   Ht 5' 5\" (1.651 m)   Wt 82.7 kg (182 lb 6.4 oz)   BMI 30.35 kg/m²     Physical Exam   Constitutional: She is oriented to person, place, and time. She appears well-developed and well-nourished. No distress. HENT:   Head: Atraumatic. Mouth/Throat: No oropharyngeal exudate. Eyes: Conjunctivae are normal. Right eye exhibits no discharge. Left eye exhibits no discharge. No scleral icterus. Neck: Normal range of motion. Neck supple. No JVD present. No tracheal deviation present. No thyromegaly present. Cardiovascular: Normal rate and regular rhythm. Exam reveals no gallop.    No murmur heard.  Pulmonary/Chest: Effort normal and breath sounds normal. No stridor. She has no wheezes. She has no rales. Abdominal: Soft. There is no tenderness. There is no rebound and no guarding. Musculoskeletal: Normal range of motion. General: Edema (ankle edema) present. No tenderness. Lymphadenopathy:     She has no cervical adenopathy. Neurological: She is alert and oriented to person, place, and time. She exhibits normal muscle tone. Skin: Skin is warm. She is not diaphoretic. Psychiatric: She has a normal mood and affect. Her behavior is normal.     ekg sinus rhythm with no acute st-t changes  I have personally reviewed patient's records available from hospital and other providers and incorporated findings in patient care. [facility]  Echo 11/2017:  SUMMARY:  Left ventricle: Systolic function was normal. Ejection fraction was  estimated to be 55 %. There were no regional wall motion abnormalities. There was mild concentric hypertrophy. Doppler parameters were consistent  with abnormal left ventricular relaxation (grade 1 diastolic dysfunction). Right ventricle: Systolic function was reduced. Mitral valve: There was mild annular calcification. There was mild  regurgitation. Tricuspid valve: There was mild regurgitation. Pulmonic valve: There was mild regurgitation. Aorta, systemic arteries: There was borderline dilatation of the ascending  Aorta. ASSESSMENT and PLAN    ICD-10-CM ICD-9-CM    1. Chronic diastolic congestive heart failure (HCC) I50.32 428.32      428.0    2. Essential hypertension I10 401.9    3. Hyperlipidemia, unspecified hyperlipidemia type E78.5 272.4    4. Coronary artery disease involving native coronary artery of native heart without angina pectoris I25.10 414.01      No orders of the defined types were placed in this encounter. Follow-up and Dispositions    · Return in about 6 months (around 6/13/2020).           Patient with chronic diastolic CHF- stable, NYHA class II. Recent cardiac cath revealed microvascular diease with no significant stenosis. She is on optimal medical therapy. Advises salt restriction and weight reduction. No new symptoms.  Continue current meds  Follow up in 6 months

## 2020-03-06 ENCOUNTER — HOSPITAL ENCOUNTER (OUTPATIENT)
Dept: LAB | Age: 85
Discharge: HOME | End: 2020-03-06
Payer: MEDICARE

## 2020-03-06 DIAGNOSIS — N18.30 CHRONIC KIDNEY DISEASE, STAGE III (MODERATE) (HCC): ICD-10-CM

## 2020-03-06 DIAGNOSIS — E78.5 HYPERLIPIDEMIA: ICD-10-CM

## 2020-03-06 DIAGNOSIS — I10 ESSENTIAL HYPERTENSION, MALIGNANT: ICD-10-CM

## 2020-03-06 LAB
ALBUMIN SERPL-MCNC: 3.8 G/DL (ref 3.4–5)
ALBUMIN/GLOB SERPL: 1 {RATIO} (ref 0.8–1.7)
ALP SERPL-CCNC: 77 U/L (ref 45–117)
ALT SERPL-CCNC: 23 U/L (ref 13–56)
ANION GAP SERPL CALC-SCNC: 3 MMOL/L (ref 3–18)
AST SERPL-CCNC: 27 U/L (ref 10–38)
BASOPHILS # BLD: 0 K/UL (ref 0–0.1)
BASOPHILS NFR BLD: 0 % (ref 0–2)
BILIRUB SERPL-MCNC: 0.7 MG/DL (ref 0.2–1)
BUN SERPL-MCNC: 20 MG/DL (ref 7–18)
BUN/CREAT SERPL: 19 (ref 12–20)
CALCIUM SERPL-MCNC: 9.4 MG/DL (ref 8.5–10.1)
CHLORIDE SERPL-SCNC: 110 MMOL/L (ref 100–111)
CHOLEST SERPL-MCNC: 136 MG/DL
CO2 SERPL-SCNC: 29 MMOL/L (ref 21–32)
CREAT SERPL-MCNC: 1.05 MG/DL (ref 0.6–1.3)
DIFFERENTIAL METHOD BLD: ABNORMAL
EOSINOPHIL # BLD: 0.2 K/UL (ref 0–0.4)
EOSINOPHIL NFR BLD: 3 % (ref 0–5)
ERYTHROCYTE [DISTWIDTH] IN BLOOD BY AUTOMATED COUNT: 14.9 % (ref 11.6–14.5)
GLOBULIN SER CALC-MCNC: 3.7 G/DL (ref 2–4)
GLUCOSE SERPL-MCNC: 72 MG/DL (ref 74–99)
HCT VFR BLD AUTO: 35.9 % (ref 35–45)
HDLC SERPL-MCNC: 59 MG/DL (ref 40–60)
HDLC SERPL: 2.3 {RATIO} (ref 0–5)
HGB BLD-MCNC: 11.3 G/DL (ref 12–16)
LDLC SERPL CALC-MCNC: 65.2 MG/DL (ref 0–100)
LIPID PROFILE,FLP: NORMAL
LYMPHOCYTES # BLD: 1.9 K/UL (ref 0.9–3.6)
LYMPHOCYTES NFR BLD: 30 % (ref 21–52)
MAGNESIUM SERPL-MCNC: 2.2 MG/DL (ref 1.6–2.6)
MCH RBC QN AUTO: 29.7 PG (ref 24–34)
MCHC RBC AUTO-ENTMCNC: 31.5 G/DL (ref 31–37)
MCV RBC AUTO: 94.2 FL (ref 74–97)
MONOCYTES # BLD: 0.5 K/UL (ref 0.05–1.2)
MONOCYTES NFR BLD: 8 % (ref 3–10)
NEUTS SEG # BLD: 3.7 K/UL (ref 1.8–8)
NEUTS SEG NFR BLD: 59 % (ref 40–73)
PHOSPHATE SERPL-MCNC: 3.4 MG/DL (ref 2.5–4.9)
PLATELET # BLD AUTO: 210 K/UL (ref 135–420)
PMV BLD AUTO: 10.8 FL (ref 9.2–11.8)
POTASSIUM SERPL-SCNC: 4.8 MMOL/L (ref 3.5–5.5)
PROT SERPL-MCNC: 7.5 G/DL (ref 6.4–8.2)
RBC # BLD AUTO: 3.81 M/UL (ref 4.2–5.3)
SODIUM SERPL-SCNC: 142 MMOL/L (ref 136–145)
TRIGL SERPL-MCNC: 59 MG/DL (ref ?–150)
URATE SERPL-MCNC: 6.6 MG/DL (ref 2.6–7.2)
VLDLC SERPL CALC-MCNC: 11.8 MG/DL
WBC # BLD AUTO: 6.2 K/UL (ref 4.6–13.2)

## 2020-03-06 PROCEDURE — 84550 ASSAY OF BLOOD/URIC ACID: CPT

## 2020-03-06 PROCEDURE — 80061 LIPID PANEL: CPT

## 2020-03-06 PROCEDURE — 80053 COMPREHEN METABOLIC PANEL: CPT

## 2020-03-06 PROCEDURE — 85025 COMPLETE CBC W/AUTO DIFF WBC: CPT

## 2020-03-06 PROCEDURE — 83735 ASSAY OF MAGNESIUM: CPT

## 2020-03-06 PROCEDURE — 84100 ASSAY OF PHOSPHORUS: CPT

## 2020-03-06 PROCEDURE — 36415 COLL VENOUS BLD VENIPUNCTURE: CPT

## 2020-07-01 ENCOUNTER — HOSPITAL ENCOUNTER (OUTPATIENT)
Dept: GENERAL RADIOLOGY | Age: 85
Discharge: HOME | End: 2020-07-01
Payer: MEDICARE

## 2020-07-01 DIAGNOSIS — R22.1 NECK SWELLING: ICD-10-CM

## 2020-07-01 PROCEDURE — 73030 X-RAY EXAM OF SHOULDER: CPT

## 2020-07-01 PROCEDURE — 73000 X-RAY EXAM OF COLLAR BONE: CPT

## 2021-02-05 ENCOUNTER — HOSPITAL ENCOUNTER (OUTPATIENT)
Dept: LAB | Age: 86
Discharge: HOME | End: 2021-02-05
Payer: MEDICARE

## 2021-02-05 ENCOUNTER — TRANSCRIBE ORDER (OUTPATIENT)
Dept: REGISTRATION | Age: 86
End: 2021-02-05

## 2021-02-05 DIAGNOSIS — E55.9 AVITAMINOSIS D: ICD-10-CM

## 2021-02-05 DIAGNOSIS — I10 ESSENTIAL HYPERTENSION, MALIGNANT: ICD-10-CM

## 2021-02-05 DIAGNOSIS — N18.4 CHRONIC KIDNEY DISEASE, STAGE IV (SEVERE) (HCC): ICD-10-CM

## 2021-02-05 DIAGNOSIS — I10 ESSENTIAL HYPERTENSION, MALIGNANT: Primary | ICD-10-CM

## 2021-02-05 LAB
25(OH)D3 SERPL-MCNC: 43.1 NG/ML (ref 30–100)
ALBUMIN SERPL-MCNC: 3.8 G/DL (ref 3.4–5)
ALBUMIN/GLOB SERPL: 1.1 {RATIO} (ref 0.8–1.7)
ALP SERPL-CCNC: 73 U/L (ref 45–117)
ALT SERPL-CCNC: 20 U/L (ref 13–56)
ANION GAP SERPL CALC-SCNC: 5 MMOL/L (ref 3–18)
AST SERPL-CCNC: 20 U/L (ref 10–38)
BASOPHILS # BLD: 0 K/UL (ref 0–0.1)
BASOPHILS NFR BLD: 1 % (ref 0–2)
BILIRUB SERPL-MCNC: 1.2 MG/DL (ref 0.2–1)
BUN SERPL-MCNC: 31 MG/DL (ref 7–18)
BUN/CREAT SERPL: 22 (ref 12–20)
CALCIUM SERPL-MCNC: 9.5 MG/DL (ref 8.5–10.1)
CHLORIDE SERPL-SCNC: 107 MMOL/L (ref 100–111)
CO2 SERPL-SCNC: 31 MMOL/L (ref 21–32)
CREAT SERPL-MCNC: 1.44 MG/DL (ref 0.6–1.3)
DIFFERENTIAL METHOD BLD: ABNORMAL
EOSINOPHIL # BLD: 0.2 K/UL (ref 0–0.4)
EOSINOPHIL NFR BLD: 3 % (ref 0–5)
ERYTHROCYTE [DISTWIDTH] IN BLOOD BY AUTOMATED COUNT: 14.4 % (ref 11.6–14.5)
GLOBULIN SER CALC-MCNC: 3.6 G/DL (ref 2–4)
GLUCOSE SERPL-MCNC: 77 MG/DL (ref 74–99)
HCT VFR BLD AUTO: 35.7 % (ref 35–45)
HGB BLD-MCNC: 11.3 G/DL (ref 12–16)
LYMPHOCYTES # BLD: 1.7 K/UL (ref 0.9–3.6)
LYMPHOCYTES NFR BLD: 28 % (ref 21–52)
MCH RBC QN AUTO: 29.9 PG (ref 24–34)
MCHC RBC AUTO-ENTMCNC: 31.7 G/DL (ref 31–37)
MCV RBC AUTO: 94.4 FL (ref 74–97)
MONOCYTES # BLD: 0.6 K/UL (ref 0.05–1.2)
MONOCYTES NFR BLD: 9 % (ref 3–10)
NEUTS SEG # BLD: 3.7 K/UL (ref 1.8–8)
NEUTS SEG NFR BLD: 59 % (ref 40–73)
PLATELET # BLD AUTO: 187 K/UL (ref 135–420)
PMV BLD AUTO: 10.9 FL (ref 9.2–11.8)
POTASSIUM SERPL-SCNC: 4.7 MMOL/L (ref 3.5–5.5)
PROT SERPL-MCNC: 7.4 G/DL (ref 6.4–8.2)
RBC # BLD AUTO: 3.78 M/UL (ref 4.2–5.3)
SODIUM SERPL-SCNC: 143 MMOL/L (ref 136–145)
WBC # BLD AUTO: 6.2 K/UL (ref 4.6–13.2)

## 2021-02-05 PROCEDURE — 82306 VITAMIN D 25 HYDROXY: CPT

## 2021-02-05 PROCEDURE — 80053 COMPREHEN METABOLIC PANEL: CPT

## 2021-02-05 PROCEDURE — 36415 COLL VENOUS BLD VENIPUNCTURE: CPT

## 2021-02-05 PROCEDURE — 85025 COMPLETE CBC W/AUTO DIFF WBC: CPT

## 2021-05-27 ENCOUNTER — TRANSCRIBE ORDER (OUTPATIENT)
Dept: REGISTRATION | Age: 86
End: 2021-05-27

## 2021-05-27 ENCOUNTER — HOSPITAL ENCOUNTER (OUTPATIENT)
Dept: LAB | Age: 86
Discharge: HOME | End: 2021-05-27
Payer: MEDICARE

## 2021-05-27 DIAGNOSIS — I10 ESSENTIAL HYPERTENSION, MALIGNANT: ICD-10-CM

## 2021-05-27 DIAGNOSIS — E78.5 HYPERLIPEMIA: ICD-10-CM

## 2021-05-27 DIAGNOSIS — N18.9 CHRONIC KIDNEY DISEASE, UNSPECIFIED: ICD-10-CM

## 2021-05-27 DIAGNOSIS — I12.9 MALIGNANT HYPERTENSIVE KIDNEY DISEASE WITH CHRONIC KIDNEY DISEASE STAGE I THROUGH STAGE IV, OR UNSPECIFIED(403.00): ICD-10-CM

## 2021-05-27 DIAGNOSIS — I10 ESSENTIAL HYPERTENSION, MALIGNANT: Primary | ICD-10-CM

## 2021-05-27 LAB
ALBUMIN SERPL-MCNC: 4 G/DL (ref 3.4–5)
ALBUMIN/GLOB SERPL: 1.2 {RATIO} (ref 0.8–1.7)
ALP SERPL-CCNC: 70 U/L (ref 45–117)
ALT SERPL-CCNC: 20 U/L (ref 13–56)
ANION GAP SERPL CALC-SCNC: 7 MMOL/L (ref 3–18)
AST SERPL-CCNC: 20 U/L (ref 10–38)
BASOPHILS # BLD: 0 K/UL (ref 0–0.1)
BASOPHILS NFR BLD: 1 % (ref 0–2)
BILIRUB SERPL-MCNC: 0.8 MG/DL (ref 0.2–1)
BUN SERPL-MCNC: 31 MG/DL (ref 7–18)
BUN/CREAT SERPL: 22 (ref 12–20)
CALCIUM SERPL-MCNC: 9.4 MG/DL (ref 8.5–10.1)
CHLORIDE SERPL-SCNC: 105 MMOL/L (ref 100–111)
CHOLEST SERPL-MCNC: 141 MG/DL
CO2 SERPL-SCNC: 31 MMOL/L (ref 21–32)
CREAT SERPL-MCNC: 1.42 MG/DL (ref 0.6–1.3)
DIFFERENTIAL METHOD BLD: ABNORMAL
EOSINOPHIL # BLD: 0.4 K/UL (ref 0–0.4)
EOSINOPHIL NFR BLD: 6 % (ref 0–5)
ERYTHROCYTE [DISTWIDTH] IN BLOOD BY AUTOMATED COUNT: 13.9 % (ref 11.6–14.5)
GLOBULIN SER CALC-MCNC: 3.4 G/DL (ref 2–4)
GLUCOSE SERPL-MCNC: 76 MG/DL (ref 74–99)
HCT VFR BLD AUTO: 35.9 % (ref 35–45)
HDLC SERPL-MCNC: 61 MG/DL (ref 40–60)
HDLC SERPL: 2.3 {RATIO} (ref 0–5)
HGB BLD-MCNC: 11.5 G/DL (ref 12–16)
LDLC SERPL CALC-MCNC: 67.8 MG/DL (ref 0–100)
LIPID PROFILE,FLP: ABNORMAL
LYMPHOCYTES # BLD: 1.7 K/UL (ref 0.9–3.6)
LYMPHOCYTES NFR BLD: 26 % (ref 21–52)
MCH RBC QN AUTO: 29.9 PG (ref 24–34)
MCHC RBC AUTO-ENTMCNC: 32 G/DL (ref 31–37)
MCV RBC AUTO: 93.5 FL (ref 74–97)
MONOCYTES # BLD: 0.6 K/UL (ref 0.05–1.2)
MONOCYTES NFR BLD: 9 % (ref 3–10)
NEUTS SEG # BLD: 3.9 K/UL (ref 1.8–8)
NEUTS SEG NFR BLD: 59 % (ref 40–73)
PLATELET # BLD AUTO: 193 K/UL (ref 135–420)
PMV BLD AUTO: 10.8 FL (ref 9.2–11.8)
POTASSIUM SERPL-SCNC: 4.4 MMOL/L (ref 3.5–5.5)
PROT SERPL-MCNC: 7.4 G/DL (ref 6.4–8.2)
RBC # BLD AUTO: 3.84 M/UL (ref 4.2–5.3)
SODIUM SERPL-SCNC: 143 MMOL/L (ref 136–145)
TRIGL SERPL-MCNC: 61 MG/DL (ref ?–150)
VLDLC SERPL CALC-MCNC: 12.2 MG/DL
WBC # BLD AUTO: 6.6 K/UL (ref 4.6–13.2)

## 2021-05-27 PROCEDURE — 80053 COMPREHEN METABOLIC PANEL: CPT

## 2021-05-27 PROCEDURE — 36415 COLL VENOUS BLD VENIPUNCTURE: CPT

## 2021-05-27 PROCEDURE — 80061 LIPID PANEL: CPT

## 2021-05-27 PROCEDURE — 85025 COMPLETE CBC W/AUTO DIFF WBC: CPT

## 2021-12-23 NOTE — DISCHARGE SUMMARY
Discharge Summary    Patient: Cuong Gatica MRN: [id number]  CSN: [id number]    YOB: 1935  Age: 80 y.o. Sex: female    DOA: 4/28/2018 LOS:  LOS: 2 days   Discharge Date: 4/30/2018     Admission Diagnoses: Elevated troponin    Discharge Diagnoses:    NSTEMI  Acute diastolic CHF  HTN  HLD  Obesity, BMI 33.8      Discharge Condition: Stable    PHYSICAL EXAM  Visit Vitals    /70 (BP 1 Location: Left arm, BP Patient Position: At rest)    Pulse 72    Temp 98.8 °F (37.1 °C)    Resp 18    Ht 5' 5.35\" (1.66 m)    Wt 90.9 kg (200 lb 6.4 oz)    SpO2 97%    Breastfeeding No    BMI 32.99 kg/m2       General: In NAD. HEENT: NCAT. Sclerae anicteric, EOMI. Lungs:  Clear, no wheezes. Effort nonlabored. Heart:  RRR. Abdomen: Soft, NTTP. Extremities: Warm, no ischemia. Psych:   Mood normal.  Neurologic:  Awake and alert. Hospital Course:   See admission H&P for full details of HPI. Patient admitted to telemetry bed with cardiology in consultation. She was continued on IV heparin/ASA/statin as initiated in ED and remained stable overnight. Cardiac catheterization was performed the following AM with findings as documented below. Patient has remained stable post-procedure and she is medically stable for discharge home with medical management and [facility] as ordered. Consults:   Cardiology      Significant Diagnostic Studies:   Cardiac cath:  FINDINGS:    The left main has wall irregularities. It bifurcates into left anterior descending artery and circumflex artery. Left anterior descending artery has diffuse wall irregularities with mildly sluggish flow suggestive of microvascular disease. Diagonal 1 and diagonal 2 artery is a small caliber vessels. Circumflex artery is nondominant with wall irregularities. Right coronary artery is dominant, medium to large caliber vessel with proximal 30% stenosis. Mid to distal right coronary artery is patent.   It bifurcates into right posterolateral artery and posterior descending artery which are patent. Left ventricular ejection fraction is 40-45% with global hypokinesis. Left ventricular end diastolic pressure was 20 mmHg. No LV aortic gradient was noted on pullback.     CONCLUSION:  Mild LV dysfunction. Nonsignificant CAD. Sluggish flow suggestive of microvascular disease. The patient is advised to be on intense medical management and risk factor modification. Discharge Medications:     Discharge Medication List as of 4/30/2018  5:33 PM      START taking these medications    Details   aspirin 81 mg chewable tablet Take 1 Tab by mouth daily. , Print, Disp-30 Tab, R-0         CONTINUE these medications which have NOT CHANGED    Details   oxyCODONE IR (ROXICODONE) 5 mg immediate release tablet Take 1 tablet by mouth every four (4) hours as needed (For pain rating 4-7). , Print, Disp-60 tablet, R-0      carvedilol (COREG) 6.25 mg tablet Take 6.25 mg by mouth two (2) times daily (with meals). , Historical Med      torsemide (DEMADEX) 20 mg tablet Take 20 mg by mouth daily. , Historical Med      azelastine (OPTIVAR) 0.05 % ophthalmic solution Administer  to both eyes two (2) times a day. Use in affected eye(s), Historical Med      Calcium Carbonate-Vit D3-Min (CALTRATE 600+D PLUS MINERALS) 600 mg calcium- 400 unit Tab Take  by mouth two (2) times a day., Historical Med      ipratropium-albuterol (COMBIVENT)  mcg/actuation inhaler Take 2 Puffs by inhalation every six (6) hours as needed., Historical Med      gabapentin (NEURONTIN) 300 mg capsule Take 300 mg by mouth two (2) times a day., Historical Med      lisinopril (PRINIVIL, ZESTRIL) 20 mg tablet Take 20 mg by mouth daily. Indications: HYPERTENSION, Historical Med      atorvastatin (LIPITOR) 40 mg tablet Take 40 mg by mouth daily. Indications: HYPERCHOLESTEROLEMIA, Historical Med      amLODIPine (NORVASC) 10 mg tablet Take 10 mg by mouth daily.  Indications: HYPERTENSION, Historical Med      potassium chloride (K-DUR, KLOR-CON) 10 mEq tablet Take 10 mEq by mouth daily. , Historical Med         STOP taking these medications       timolol maleate 0.5 % DrpD ophthalmic solution Comments:   Reason for Stopping:         MG TRISILICATE/ALH/NAHCO3/AA (GAVISCON PO) Comments:   Reason for Stopping:         OTHER Comments:   Reason for Stopping:         BUDESONIDE (RHINOCORT AQUA NA) Comments:   Reason for Stopping:         furosemide (LASIX) 20 mg tablet Comments:   Reason for Stopping:         calcium 500 mg Tab Comments:   Reason for Stopping:                 Activity: As tolerated    Diet: Cardiac Diet and Low fat, Low cholesterol    Follow-up: with PCP, Salomon Ireland MD in 1 week and cardiology as directed. Nicolasa Mtz.  David Willson MD  [facility] No